# Patient Record
Sex: FEMALE | Race: ASIAN | NOT HISPANIC OR LATINO
[De-identification: names, ages, dates, MRNs, and addresses within clinical notes are randomized per-mention and may not be internally consistent; named-entity substitution may affect disease eponyms.]

---

## 2017-03-15 ENCOUNTER — APPOINTMENT (OUTPATIENT)
Dept: CARDIOLOGY | Facility: CLINIC | Age: 82
End: 2017-03-15

## 2017-03-23 ENCOUNTER — LABORATORY RESULT (OUTPATIENT)
Age: 82
End: 2017-03-23

## 2017-03-23 ENCOUNTER — NON-APPOINTMENT (OUTPATIENT)
Age: 82
End: 2017-03-23

## 2017-03-23 ENCOUNTER — APPOINTMENT (OUTPATIENT)
Dept: CARDIOLOGY | Facility: CLINIC | Age: 82
End: 2017-03-23

## 2017-03-23 VITALS
SYSTOLIC BLOOD PRESSURE: 144 MMHG | DIASTOLIC BLOOD PRESSURE: 70 MMHG | WEIGHT: 183 LBS | OXYGEN SATURATION: 97 % | TEMPERATURE: 97.5 F | RESPIRATION RATE: 16 BRPM | BODY MASS INDEX: 32.42 KG/M2 | HEART RATE: 66 BPM

## 2017-03-28 LAB
25(OH)D3 SERPL-MCNC: 45.6 NG/ML
ALBUMIN SERPL ELPH-MCNC: 4 G/DL
ALP BLD-CCNC: 66 U/L
ALT SERPL-CCNC: 33 U/L
ANION GAP SERPL CALC-SCNC: 12 MMOL/L
APPEARANCE: CLEAR
AST SERPL-CCNC: 34 U/L
BASOPHILS # BLD AUTO: 0.01 K/UL
BASOPHILS NFR BLD AUTO: 0.2 %
BILIRUB SERPL-MCNC: 0.3 MG/DL
BILIRUBIN URINE: NEGATIVE
BLOOD URINE: ABNORMAL
BUN SERPL-MCNC: 14 MG/DL
CALCIUM SERPL-MCNC: 8.9 MG/DL
CHLORIDE SERPL-SCNC: 97 MMOL/L
CHOLEST SERPL-MCNC: 174 MG/DL
CHOLEST/HDLC SERPL: 3.6 RATIO
CO2 SERPL-SCNC: 28 MMOL/L
COLOR: YELLOW
CREAT SERPL-MCNC: 0.56 MG/DL
EOSINOPHIL # BLD AUTO: 0.06 K/UL
EOSINOPHIL NFR BLD AUTO: 1.2 %
GLUCOSE QUALITATIVE U: NORMAL MG/DL
GLUCOSE SERPL-MCNC: 95 MG/DL
HBA1C MFR BLD HPLC: 6.8 %
HCT VFR BLD CALC: 36.9 %
HDLC SERPL-MCNC: 49 MG/DL
HGB BLD-MCNC: 12.1 G/DL
IMM GRANULOCYTES NFR BLD AUTO: 0.2 %
KETONES URINE: NEGATIVE
LDLC SERPL CALC-MCNC: 96 MG/DL
LEUKOCYTE ESTERASE URINE: NEGATIVE
LYMPHOCYTES # BLD AUTO: 2.06 K/UL
LYMPHOCYTES NFR BLD AUTO: 39.8 %
MAN DIFF?: NORMAL
MCHC RBC-ENTMCNC: 32.8 GM/DL
MCHC RBC-ENTMCNC: 32.8 PG
MCV RBC AUTO: 100 FL
MONOCYTES # BLD AUTO: 0.54 K/UL
MONOCYTES NFR BLD AUTO: 10.4 %
NEUTROPHILS # BLD AUTO: 2.49 K/UL
NEUTROPHILS NFR BLD AUTO: 48.2 %
NITRITE URINE: NEGATIVE
PH URINE: 7
PLATELET # BLD AUTO: 162 K/UL
POTASSIUM SERPL-SCNC: 3.7 MMOL/L
PROT SERPL-MCNC: 7.4 G/DL
PROTEIN URINE: NEGATIVE MG/DL
RBC # BLD: 3.69 M/UL
RBC # FLD: 12.6 %
SODIUM SERPL-SCNC: 137 MMOL/L
SPECIFIC GRAVITY URINE: 1.01
TRIGL SERPL-MCNC: 145 MG/DL
TSH SERPL-ACNC: 2.99 UIU/ML
UROBILINOGEN URINE: NORMAL MG/DL
VIT B12 SERPL-MCNC: 467 PG/ML
WBC # FLD AUTO: 5.17 K/UL

## 2017-05-31 ENCOUNTER — MEDICATION RENEWAL (OUTPATIENT)
Age: 82
End: 2017-05-31

## 2017-07-31 ENCOUNTER — MEDICATION RENEWAL (OUTPATIENT)
Age: 82
End: 2017-07-31

## 2017-09-12 ENCOUNTER — APPOINTMENT (OUTPATIENT)
Dept: CARDIOLOGY | Facility: CLINIC | Age: 82
End: 2017-09-12
Payer: MEDICARE

## 2017-09-12 VITALS
RESPIRATION RATE: 17 BRPM | SYSTOLIC BLOOD PRESSURE: 138 MMHG | WEIGHT: 176 LBS | BODY MASS INDEX: 31.18 KG/M2 | TEMPERATURE: 99.3 F | HEART RATE: 79 BPM | DIASTOLIC BLOOD PRESSURE: 73 MMHG | OXYGEN SATURATION: 96 %

## 2017-09-12 PROCEDURE — 99214 OFFICE O/P EST MOD 30 MIN: CPT

## 2017-09-15 LAB
25(OH)D3 SERPL-MCNC: 35.5 NG/ML
ALBUMIN SERPL ELPH-MCNC: 4.1 G/DL
ALP BLD-CCNC: 70 U/L
ALT SERPL-CCNC: 41 U/L
ANION GAP SERPL CALC-SCNC: 14 MMOL/L
AST SERPL-CCNC: 43 U/L
BASOPHILS # BLD AUTO: 0.01 K/UL
BASOPHILS NFR BLD AUTO: 0.2 %
BILIRUB SERPL-MCNC: 0.4 MG/DL
BUN SERPL-MCNC: 17 MG/DL
CALCIUM SERPL-MCNC: 9.8 MG/DL
CHLORIDE SERPL-SCNC: 99 MMOL/L
CHOLEST SERPL-MCNC: 194 MG/DL
CHOLEST/HDLC SERPL: 3.5 RATIO
CO2 SERPL-SCNC: 27 MMOL/L
CREAT SERPL-MCNC: 0.59 MG/DL
EOSINOPHIL # BLD AUTO: 0.06 K/UL
EOSINOPHIL NFR BLD AUTO: 1.2 %
GLUCOSE SERPL-MCNC: 184 MG/DL
HBA1C MFR BLD HPLC: 6.1 %
HCT VFR BLD CALC: 37.5 %
HDLC SERPL-MCNC: 56 MG/DL
HGB BLD-MCNC: 12.4 G/DL
IMM GRANULOCYTES NFR BLD AUTO: 0.2 %
LDLC SERPL CALC-MCNC: 111 MG/DL
LYMPHOCYTES # BLD AUTO: 1.78 K/UL
LYMPHOCYTES NFR BLD AUTO: 36.5 %
MAN DIFF?: NORMAL
MCHC RBC-ENTMCNC: 32.5 PG
MCHC RBC-ENTMCNC: 33.1 GM/DL
MCV RBC AUTO: 98.2 FL
MONOCYTES # BLD AUTO: 0.36 K/UL
MONOCYTES NFR BLD AUTO: 7.4 %
NEUTROPHILS # BLD AUTO: 2.66 K/UL
NEUTROPHILS NFR BLD AUTO: 54.5 %
PLATELET # BLD AUTO: 169 K/UL
POTASSIUM SERPL-SCNC: 3.8 MMOL/L
PROT SERPL-MCNC: 7.6 G/DL
RBC # BLD: 3.82 M/UL
RBC # FLD: 13 %
SODIUM SERPL-SCNC: 140 MMOL/L
TRIGL SERPL-MCNC: 136 MG/DL
TSH SERPL-ACNC: 3.41 UIU/ML
WBC # FLD AUTO: 4.88 K/UL

## 2017-10-09 ENCOUNTER — RESULT REVIEW (OUTPATIENT)
Age: 82
End: 2017-10-09

## 2017-11-21 ENCOUNTER — NON-APPOINTMENT (OUTPATIENT)
Age: 82
End: 2017-11-21

## 2017-11-21 ENCOUNTER — APPOINTMENT (OUTPATIENT)
Dept: CARDIOLOGY | Facility: CLINIC | Age: 82
End: 2017-11-21
Payer: MEDICARE

## 2017-11-21 VITALS
DIASTOLIC BLOOD PRESSURE: 75 MMHG | HEART RATE: 64 BPM | TEMPERATURE: 99 F | SYSTOLIC BLOOD PRESSURE: 155 MMHG | WEIGHT: 179 LBS | BODY MASS INDEX: 31.71 KG/M2 | RESPIRATION RATE: 17 BRPM | OXYGEN SATURATION: 98 %

## 2017-11-21 DIAGNOSIS — R94.31 ABNORMAL ELECTROCARDIOGRAM [ECG] [EKG]: ICD-10-CM

## 2017-11-21 PROCEDURE — 99214 OFFICE O/P EST MOD 30 MIN: CPT

## 2017-11-21 PROCEDURE — 93000 ELECTROCARDIOGRAM COMPLETE: CPT

## 2017-12-14 ENCOUNTER — RX RENEWAL (OUTPATIENT)
Age: 82
End: 2017-12-14

## 2017-12-30 ENCOUNTER — MEDICATION RENEWAL (OUTPATIENT)
Age: 82
End: 2017-12-30

## 2018-01-12 ENCOUNTER — MEDICATION RENEWAL (OUTPATIENT)
Age: 83
End: 2018-01-12

## 2018-01-23 ENCOUNTER — OTHER (OUTPATIENT)
Age: 83
End: 2018-01-23

## 2018-01-23 DIAGNOSIS — M51.9 UNSPECIFIED THORACIC, THORACOLUMBAR AND LUMBOSACRAL INTERVERTEBRAL DISC DISORDER: ICD-10-CM

## 2018-01-23 DIAGNOSIS — M25.519 PAIN IN UNSPECIFIED SHOULDER: ICD-10-CM

## 2018-02-22 ENCOUNTER — APPOINTMENT (OUTPATIENT)
Dept: CARDIOLOGY | Facility: CLINIC | Age: 83
End: 2018-02-22
Payer: MEDICARE

## 2018-02-22 VITALS
OXYGEN SATURATION: 97 % | TEMPERATURE: 97.9 F | RESPIRATION RATE: 18 BRPM | SYSTOLIC BLOOD PRESSURE: 161 MMHG | DIASTOLIC BLOOD PRESSURE: 78 MMHG | WEIGHT: 185 LBS | HEART RATE: 69 BPM | BODY MASS INDEX: 32.77 KG/M2

## 2018-02-22 PROCEDURE — 90686 IIV4 VACC NO PRSV 0.5 ML IM: CPT

## 2018-02-22 PROCEDURE — 99214 OFFICE O/P EST MOD 30 MIN: CPT

## 2018-02-22 PROCEDURE — G0008: CPT

## 2018-04-02 ENCOUNTER — RX RENEWAL (OUTPATIENT)
Age: 83
End: 2018-04-02

## 2018-06-11 ENCOUNTER — NON-APPOINTMENT (OUTPATIENT)
Age: 83
End: 2018-06-11

## 2018-06-11 ENCOUNTER — APPOINTMENT (OUTPATIENT)
Age: 83
End: 2018-06-11
Payer: MEDICARE

## 2018-06-11 ENCOUNTER — OTHER (OUTPATIENT)
Age: 83
End: 2018-06-11

## 2018-06-11 VITALS
SYSTOLIC BLOOD PRESSURE: 133 MMHG | RESPIRATION RATE: 18 BRPM | BODY MASS INDEX: 32.24 KG/M2 | OXYGEN SATURATION: 96 % | HEART RATE: 77 BPM | TEMPERATURE: 98.6 F | DIASTOLIC BLOOD PRESSURE: 76 MMHG | WEIGHT: 182 LBS

## 2018-06-11 DIAGNOSIS — M54.2 CERVICALGIA: ICD-10-CM

## 2018-06-11 PROCEDURE — 93000 ELECTROCARDIOGRAM COMPLETE: CPT

## 2018-06-11 PROCEDURE — 99214 OFFICE O/P EST MOD 30 MIN: CPT

## 2018-06-25 ENCOUNTER — MEDICATION RENEWAL (OUTPATIENT)
Age: 83
End: 2018-06-25

## 2018-06-29 PROBLEM — M54.2 NECK DISCOMFORT: Status: ACTIVE | Noted: 2018-06-29

## 2018-07-20 ENCOUNTER — MEDICATION RENEWAL (OUTPATIENT)
Age: 83
End: 2018-07-20

## 2018-07-23 ENCOUNTER — APPOINTMENT (OUTPATIENT)
Dept: CARDIOLOGY | Facility: CLINIC | Age: 83
End: 2018-07-23
Payer: MEDICARE

## 2018-07-23 VITALS
WEIGHT: 181 LBS | DIASTOLIC BLOOD PRESSURE: 74 MMHG | OXYGEN SATURATION: 95 % | BODY MASS INDEX: 32.06 KG/M2 | RESPIRATION RATE: 18 BRPM | TEMPERATURE: 98.1 F | SYSTOLIC BLOOD PRESSURE: 146 MMHG | HEART RATE: 69 BPM

## 2018-07-23 PROCEDURE — 99214 OFFICE O/P EST MOD 30 MIN: CPT

## 2018-07-23 RX ORDER — BUDESONIDE AND FORMOTEROL FUMARATE DIHYDRATE 80; 4.5 UG/1; UG/1
80-4.5 AEROSOL RESPIRATORY (INHALATION) TWICE DAILY
Qty: 1 | Refills: 0 | Status: ACTIVE | COMMUNITY
Start: 2018-07-23 | End: 1900-01-01

## 2018-07-24 ENCOUNTER — OTHER (OUTPATIENT)
Age: 83
End: 2018-07-24

## 2018-08-10 ENCOUNTER — MEDICATION RENEWAL (OUTPATIENT)
Age: 83
End: 2018-08-10

## 2018-11-04 NOTE — REASON FOR VISIT
[Follow-Up - Clinic] : a clinic follow-up of [Hyperlipidemia] : hyperlipidemia [Hypertension] : hypertension

## 2018-11-05 ENCOUNTER — APPOINTMENT (OUTPATIENT)
Dept: CARDIOLOGY | Facility: CLINIC | Age: 83
End: 2018-11-05
Payer: MEDICARE

## 2018-11-05 VITALS
OXYGEN SATURATION: 97 % | TEMPERATURE: 98.1 F | RESPIRATION RATE: 18 BRPM | DIASTOLIC BLOOD PRESSURE: 73 MMHG | SYSTOLIC BLOOD PRESSURE: 156 MMHG | HEART RATE: 74 BPM | WEIGHT: 184 LBS | BODY MASS INDEX: 32.6 KG/M2 | HEIGHT: 63 IN

## 2018-11-05 PROCEDURE — G0008: CPT

## 2018-11-05 PROCEDURE — 99214 OFFICE O/P EST MOD 30 MIN: CPT

## 2018-11-05 PROCEDURE — 90682 RIV4 VACC RECOMBINANT DNA IM: CPT

## 2018-11-06 ENCOUNTER — MEDICATION RENEWAL (OUTPATIENT)
Age: 83
End: 2018-11-06

## 2018-11-15 LAB
25(OH)D3 SERPL-MCNC: 52.5 NG/ML
ALBUMIN SERPL ELPH-MCNC: 4.3 G/DL
ALP BLD-CCNC: 60 U/L
ALT SERPL-CCNC: 67 U/L
ANION GAP SERPL CALC-SCNC: 13 MMOL/L
AST SERPL-CCNC: 89 U/L
BASOPHILS # BLD AUTO: 0.01 K/UL
BASOPHILS NFR BLD AUTO: 0.1 %
BILIRUB SERPL-MCNC: 0.4 MG/DL
BUN SERPL-MCNC: 16 MG/DL
CALCIUM SERPL-MCNC: 9.7 MG/DL
CHLORIDE SERPL-SCNC: 98 MMOL/L
CHOLEST SERPL-MCNC: 177 MG/DL
CHOLEST/HDLC SERPL: 3.3 RATIO
CO2 SERPL-SCNC: 29 MMOL/L
CREAT SERPL-MCNC: 0.56 MG/DL
EOSINOPHIL # BLD AUTO: 0.1 K/UL
EOSINOPHIL NFR BLD AUTO: 1.4 %
GLUCOSE SERPL-MCNC: 76 MG/DL
HBA1C MFR BLD HPLC: 7 %
HCT VFR BLD CALC: 37.7 %
HDLC SERPL-MCNC: 53 MG/DL
HGB BLD-MCNC: 12.7 G/DL
IMM GRANULOCYTES NFR BLD AUTO: 0.3 %
LDLC SERPL CALC-MCNC: 102 MG/DL
LYMPHOCYTES # BLD AUTO: 3.37 K/UL
LYMPHOCYTES NFR BLD AUTO: 46.3 %
MAN DIFF?: NORMAL
MCHC RBC-ENTMCNC: 32.9 PG
MCHC RBC-ENTMCNC: 33.7 GM/DL
MCV RBC AUTO: 97.7 FL
MONOCYTES # BLD AUTO: 0.64 K/UL
MONOCYTES NFR BLD AUTO: 8.8 %
NEUTROPHILS # BLD AUTO: 3.14 K/UL
NEUTROPHILS NFR BLD AUTO: 43.1 %
PLATELET # BLD AUTO: 167 K/UL
POTASSIUM SERPL-SCNC: 4.1 MMOL/L
PROT SERPL-MCNC: 7.8 G/DL
RBC # BLD: 3.86 M/UL
RBC # FLD: 12.9 %
SODIUM SERPL-SCNC: 140 MMOL/L
TRIGL SERPL-MCNC: 112 MG/DL
TSH SERPL-ACNC: 3.26 UIU/ML
WBC # FLD AUTO: 7.28 K/UL

## 2019-01-08 ENCOUNTER — MEDICATION RENEWAL (OUTPATIENT)
Age: 84
End: 2019-01-08

## 2019-01-21 ENCOUNTER — MEDICATION RENEWAL (OUTPATIENT)
Age: 84
End: 2019-01-21

## 2019-02-13 ENCOUNTER — APPOINTMENT (OUTPATIENT)
Dept: CARDIOLOGY | Facility: CLINIC | Age: 84
End: 2019-02-13
Payer: MEDICARE

## 2019-02-13 VITALS
OXYGEN SATURATION: 95 % | WEIGHT: 183 LBS | DIASTOLIC BLOOD PRESSURE: 73 MMHG | TEMPERATURE: 99.4 F | SYSTOLIC BLOOD PRESSURE: 138 MMHG | RESPIRATION RATE: 17 BRPM | HEART RATE: 86 BPM | BODY MASS INDEX: 32.42 KG/M2

## 2019-02-13 DIAGNOSIS — R05 COUGH: ICD-10-CM

## 2019-02-13 PROCEDURE — 99214 OFFICE O/P EST MOD 30 MIN: CPT

## 2019-02-13 RX ORDER — GUAIFENESIN AND CODEINE PHOSPHATE 10; 100 MG/5ML; MG/5ML
100-10 SOLUTION ORAL
Qty: 1 | Refills: 0 | Status: ACTIVE | COMMUNITY
Start: 2018-07-24 | End: 1900-01-01

## 2019-02-13 RX ORDER — AZITHROMYCIN 250 MG/1
250 TABLET, FILM COATED ORAL
Qty: 6 | Refills: 0 | Status: ACTIVE | COMMUNITY
Start: 2018-07-23 | End: 1900-01-01

## 2019-03-05 ENCOUNTER — MEDICATION RENEWAL (OUTPATIENT)
Age: 84
End: 2019-03-05

## 2019-03-25 ENCOUNTER — RX RENEWAL (OUTPATIENT)
Age: 84
End: 2019-03-25

## 2019-04-01 ENCOUNTER — MEDICATION RENEWAL (OUTPATIENT)
Age: 84
End: 2019-04-01

## 2019-04-02 ENCOUNTER — NON-APPOINTMENT (OUTPATIENT)
Age: 84
End: 2019-04-02

## 2019-04-02 ENCOUNTER — APPOINTMENT (OUTPATIENT)
Dept: CARDIOLOGY | Facility: CLINIC | Age: 84
End: 2019-04-02
Payer: MEDICARE

## 2019-04-02 VITALS
BODY MASS INDEX: 32.95 KG/M2 | DIASTOLIC BLOOD PRESSURE: 80 MMHG | TEMPERATURE: 98.1 F | WEIGHT: 186 LBS | OXYGEN SATURATION: 95 % | HEART RATE: 81 BPM | SYSTOLIC BLOOD PRESSURE: 170 MMHG | RESPIRATION RATE: 17 BRPM

## 2019-04-02 DIAGNOSIS — R73.9 HYPERGLYCEMIA, UNSPECIFIED: ICD-10-CM

## 2019-04-02 PROCEDURE — 99214 OFFICE O/P EST MOD 30 MIN: CPT

## 2019-04-02 PROCEDURE — 93000 ELECTROCARDIOGRAM COMPLETE: CPT

## 2019-04-04 LAB
ALBUMIN SERPL ELPH-MCNC: 4.3 G/DL
ALP BLD-CCNC: 70 U/L
ALT SERPL-CCNC: 51 U/L
ANION GAP SERPL CALC-SCNC: 14 MMOL/L
AST SERPL-CCNC: 61 U/L
BILIRUB SERPL-MCNC: 0.6 MG/DL
BUN SERPL-MCNC: 18 MG/DL
CALCIUM SERPL-MCNC: 10.3 MG/DL
CHLORIDE SERPL-SCNC: 98 MMOL/L
CO2 SERPL-SCNC: 30 MMOL/L
CREAT SERPL-MCNC: 0.58 MG/DL
ESTIMATED AVERAGE GLUCOSE: 169 MG/DL
GLUCOSE SERPL-MCNC: 235 MG/DL
HBA1C MFR BLD HPLC: 7.5 %
POTASSIUM SERPL-SCNC: 4.4 MMOL/L
PROT SERPL-MCNC: 7.4 G/DL
SODIUM SERPL-SCNC: 142 MMOL/L

## 2019-05-17 ENCOUNTER — APPOINTMENT (OUTPATIENT)
Dept: CARDIOLOGY | Facility: CLINIC | Age: 84
End: 2019-05-17
Payer: MEDICARE

## 2019-05-17 VITALS
DIASTOLIC BLOOD PRESSURE: 72 MMHG | HEART RATE: 82 BPM | RESPIRATION RATE: 17 BRPM | TEMPERATURE: 97.8 F | WEIGHT: 180 LBS | OXYGEN SATURATION: 96 % | SYSTOLIC BLOOD PRESSURE: 156 MMHG | BODY MASS INDEX: 31.89 KG/M2

## 2019-05-17 DIAGNOSIS — M25.569 PAIN IN UNSPECIFIED KNEE: ICD-10-CM

## 2019-05-17 PROCEDURE — 99214 OFFICE O/P EST MOD 30 MIN: CPT

## 2019-05-17 RX ORDER — DICLOFENAC SODIUM 10 MG/G
1 GEL TOPICAL
Qty: 1 | Refills: 1 | Status: ACTIVE | COMMUNITY
Start: 2019-05-17 | End: 1900-01-01

## 2019-05-17 NOTE — HISTORY OF PRESENT ILLNESS
[FreeTextEntry1] : Patient twisted her R knee last Saturday and may need an MRI. Patient reports that she has been skipping dinner and she felt better and stopped taking DM medications. Patient would need a blood test.  BP is 156/72. Patient reports that she is more tired last few weeks from babysitting her grandkid.

## 2019-05-17 NOTE — PHYSICAL EXAM
[General Appearance - Well Developed] : well developed [Normal Appearance] : normal appearance [Well Groomed] : well groomed [General Appearance - Well Nourished] : well nourished [General Appearance - In No Acute Distress] : no acute distress [No Deformities] : no deformities [Normal Conjunctiva] : the conjunctiva exhibited no abnormalities [Normal Oral Mucosa] : normal oral mucosa [Eyelids - No Xanthelasma] : the eyelids demonstrated no xanthelasmas [No Oral Pallor] : no oral pallor [No Oral Cyanosis] : no oral cyanosis [Normal Jugular Venous V Waves Present] : normal jugular venous V waves present [Normal Jugular Venous A Waves Present] : normal jugular venous A waves present [No Jugular Venous Martinez A Waves] : no jugular venous martinez A waves [Respiration, Rhythm And Depth] : normal respiratory rhythm and effort [Exaggerated Use Of Accessory Muscles For Inspiration] : no accessory muscle use [Auscultation Breath Sounds / Voice Sounds] : lungs were clear to auscultation bilaterally [Heart Rate And Rhythm] : heart rate and rhythm were normal [Heart Sounds] : normal S1 and S2 [Abdomen Soft] : soft [Murmurs] : no murmurs present [Abdomen Tenderness] : non-tender [Abdomen Mass (___ Cm)] : no abdominal mass palpated [Abnormal Walk] : normal gait [Nail Clubbing] : no clubbing of the fingernails [Gait - Sufficient For Exercise Testing] : the gait was sufficient for exercise testing [] : no ischemic changes [Petechial Hemorrhages (___cm)] : no petechial hemorrhages [Cyanosis, Localized] : no localized cyanosis [Oriented To Time, Place, And Person] : oriented to person, place, and time [Mood] : the mood was normal [Affect] : the affect was normal [No Anxiety] : not feeling anxious

## 2019-05-31 ENCOUNTER — MEDICATION RENEWAL (OUTPATIENT)
Age: 84
End: 2019-05-31

## 2019-06-24 ENCOUNTER — FORM ENCOUNTER (OUTPATIENT)
Age: 84
End: 2019-06-24

## 2019-06-25 ENCOUNTER — OUTPATIENT (OUTPATIENT)
Dept: OUTPATIENT SERVICES | Facility: HOSPITAL | Age: 84
LOS: 1 days | End: 2019-06-25
Payer: MEDICARE

## 2019-06-25 ENCOUNTER — APPOINTMENT (OUTPATIENT)
Dept: ORTHOPEDIC SURGERY | Facility: CLINIC | Age: 84
End: 2019-06-25
Payer: MEDICARE

## 2019-06-25 PROCEDURE — 73562 X-RAY EXAM OF KNEE 3: CPT

## 2019-06-25 PROCEDURE — 73562 X-RAY EXAM OF KNEE 3: CPT | Mod: 26,50

## 2019-06-25 PROCEDURE — 20610 DRAIN/INJ JOINT/BURSA W/O US: CPT | Mod: RT

## 2019-06-25 PROCEDURE — 99203 OFFICE O/P NEW LOW 30 MIN: CPT | Mod: 25

## 2019-06-25 NOTE — PROCEDURE
[Injection] : Injection [Right] : of the right [Knee Joint] : knee joint [Joint Pain] : Joint pain [Patient] : patient [Risk] : risk [Benefits] : benefits [Alternatives] : alternatives [Bleeding] : bleeding [Infection] : infection [Allergic Reaction] : allergic reaction [Verbal Consent Obtained] : verbal consent was obtained prior to the procedure [Alcohol] : Alcohol [Betadine] : Betadine [20] : a 20-gauge [1% Lidocaine___(mL)] : [unfilled] mL of 1% Lidocaine [Methylpred. 40mg/mL___(mL)] : [unfilled] mL of 40mg/mL methylprednisolone [Bandage Applied] : a bandage [Tolerated Well] : The patient tolerated the procedure well [None] : none

## 2019-06-28 NOTE — ASSESSMENT
[FreeTextEntry1] : plan:\par -injection performed today\par -f/u in 6 weeks\par -if pain persists will do gel....

## 2019-06-28 NOTE — REASON FOR VISIT
[Initial Visit] : an initial visit for [Knee Pain] : knee pain [Family Member] : family member [FreeTextEntry2] : right knee

## 2019-06-28 NOTE — PHYSICAL EXAM
[de-identified] : right knee:\par ROM 0-100\par stable v.v\par stable AP\par normal alignment\par crepitus on ROM\par medial joint line tenderness [de-identified] : bilateral knee severe PF OA. medial compartment narrowing.\par \par MRI reviewed. report on chart

## 2019-06-28 NOTE — HISTORY OF PRESENT ILLNESS
[de-identified] : 83 female with right knee pain following long car ride with leg held in bent position. pain is parapatellar in nature and favors medially. there was no other injury. she has had long standing pain in the knee and it was only recently exacerbated. she would like to pursue all non operative measures

## 2019-07-31 ENCOUNTER — APPOINTMENT (OUTPATIENT)
Dept: ORTHOPEDIC SURGERY | Facility: CLINIC | Age: 84
End: 2019-07-31
Payer: MEDICARE

## 2019-07-31 PROCEDURE — 99213 OFFICE O/P EST LOW 20 MIN: CPT

## 2019-08-02 NOTE — HISTORY OF PRESENT ILLNESS
[de-identified] : This is a 84-year-old female with long history of right knee pain. Patient is here more month ago when we injected her right knee with cortisone. She states that he's had intermittent pain secondary to increase his activities and then following that with decrease in activities. She is found that if she walks at a slow pace she has decreased pain. She states the pain is primarily in the parapatellar area and when she exercises too much.

## 2019-08-02 NOTE — ASSESSMENT
[FreeTextEntry1] : Assessment: This is an 84-year-old female with right knee osteoarthritis primarily patellofemoral region\par Plan: We are going to have her continue with her decrease activities. She is also going to followup in 2 months with one time we'll injection he again with cortisone. We should also plan on discussing a gel injections with her the possible.\par \par All medical record entries made by the PA/Scribe/Fellow are at my, Dr. Kieran Kapoor's direction and personally dictated by me on 07/31/2019]. I have reviewed the chart and agree that the record accurately reflects my personal performance of the history, physical exam, assessment, and plan. I have also personally directed reviewed, and agreed with the chart.\par

## 2019-08-02 NOTE — PHYSICAL EXAM
[de-identified] : Physical examination demonstrates her right knee has 0-95° of range of motion. There is no significant effusion present. His pain in the medial joint line as well as the parapatellar area. She is stable to varus valgus stress. She is neurovascular intact distally

## 2019-09-18 ENCOUNTER — APPOINTMENT (OUTPATIENT)
Dept: ORTHOPEDIC SURGERY | Facility: CLINIC | Age: 84
End: 2019-09-18
Payer: MEDICARE

## 2019-09-18 DIAGNOSIS — M17.10 UNILATERAL PRIMARY OSTEOARTHRITIS, UNSPECIFIED KNEE: ICD-10-CM

## 2019-09-18 PROCEDURE — 99213 OFFICE O/P EST LOW 20 MIN: CPT

## 2019-09-24 ENCOUNTER — APPOINTMENT (OUTPATIENT)
Dept: CARDIOLOGY | Facility: CLINIC | Age: 84
End: 2019-09-24
Payer: MEDICARE

## 2019-09-24 VITALS
DIASTOLIC BLOOD PRESSURE: 73 MMHG | SYSTOLIC BLOOD PRESSURE: 151 MMHG | HEART RATE: 88 BPM | BODY MASS INDEX: 32.24 KG/M2 | RESPIRATION RATE: 17 BRPM | TEMPERATURE: 98 F | OXYGEN SATURATION: 95 % | WEIGHT: 182 LBS

## 2019-09-24 PROCEDURE — 99214 OFFICE O/P EST MOD 30 MIN: CPT

## 2019-09-24 PROCEDURE — 90688 IIV4 VACCINE SPLT 0.5 ML IM: CPT

## 2019-09-24 PROCEDURE — G0008: CPT

## 2019-09-24 NOTE — HISTORY OF PRESENT ILLNESS
[FreeTextEntry1] : Patient's BP is 140 on second measure. She is only taking 1/2 of Losartan 50 mg. Patient has stopped taking Metformin 500 mg because she stopped eating rice at night. She is getting experimental knee treatment and doing well on knee exercises. She had a recent derm biopsy for a basal cell ca on the nose.

## 2019-09-24 NOTE — PHYSICAL EXAM
[General Appearance - Well Developed] : well developed [Normal Appearance] : normal appearance [Well Groomed] : well groomed [General Appearance - Well Nourished] : well nourished [No Deformities] : no deformities [General Appearance - In No Acute Distress] : no acute distress [Normal Conjunctiva] : the conjunctiva exhibited no abnormalities [Eyelids - No Xanthelasma] : the eyelids demonstrated no xanthelasmas [Normal Oral Mucosa] : normal oral mucosa [No Oral Pallor] : no oral pallor [No Oral Cyanosis] : no oral cyanosis [Normal Jugular Venous A Waves Present] : normal jugular venous A waves present [Normal Jugular Venous V Waves Present] : normal jugular venous V waves present [No Jugular Venous Martinez A Waves] : no jugular venous martinez A waves [Respiration, Rhythm And Depth] : normal respiratory rhythm and effort [Exaggerated Use Of Accessory Muscles For Inspiration] : no accessory muscle use [Auscultation Breath Sounds / Voice Sounds] : lungs were clear to auscultation bilaterally [Heart Rate And Rhythm] : heart rate and rhythm were normal [Heart Sounds] : normal S1 and S2 [Murmurs] : no murmurs present [Abdomen Soft] : soft [Abdomen Tenderness] : non-tender [Abdomen Mass (___ Cm)] : no abdominal mass palpated [Abnormal Walk] : normal gait [Gait - Sufficient For Exercise Testing] : the gait was sufficient for exercise testing [Nail Clubbing] : no clubbing of the fingernails [Cyanosis, Localized] : no localized cyanosis [Petechial Hemorrhages (___cm)] : no petechial hemorrhages [] : no ischemic changes [Oriented To Time, Place, And Person] : oriented to person, place, and time [Affect] : the affect was normal [Mood] : the mood was normal [No Anxiety] : not feeling anxious

## 2019-09-25 ENCOUNTER — APPOINTMENT (OUTPATIENT)
Dept: ORTHOPEDIC SURGERY | Facility: CLINIC | Age: 84
End: 2019-09-25
Payer: MEDICARE

## 2019-09-25 LAB
25(OH)D3 SERPL-MCNC: 40.5 NG/ML
ALBUMIN SERPL ELPH-MCNC: 4.4 G/DL
ALP BLD-CCNC: 58 U/L
ALT SERPL-CCNC: 31 U/L
ANION GAP SERPL CALC-SCNC: 15 MMOL/L
AST SERPL-CCNC: 35 U/L
BASOPHILS # BLD AUTO: 0.01 K/UL
BASOPHILS NFR BLD AUTO: 0.2 %
BILIRUB SERPL-MCNC: 0.4 MG/DL
BUN SERPL-MCNC: 15 MG/DL
CALCIUM SERPL-MCNC: 9.3 MG/DL
CHLORIDE SERPL-SCNC: 100 MMOL/L
CHOLEST SERPL-MCNC: 175 MG/DL
CHOLEST/HDLC SERPL: 3.8 RATIO
CO2 SERPL-SCNC: 27 MMOL/L
CREAT SERPL-MCNC: 0.58 MG/DL
EOSINOPHIL # BLD AUTO: 0.03 K/UL
EOSINOPHIL NFR BLD AUTO: 0.6 %
ESTIMATED AVERAGE GLUCOSE: 148 MG/DL
GLUCOSE SERPL-MCNC: 269 MG/DL
HBA1C MFR BLD HPLC: 6.8 %
HBV SURFACE AB SER QL: NONREACTIVE
HBV SURFACE AG SER QL: NONREACTIVE
HCT VFR BLD CALC: 40.1 %
HCV AB SER QL: NONREACTIVE
HCV S/CO RATIO: 0.19 S/CO
HDLC SERPL-MCNC: 46 MG/DL
HGB BLD-MCNC: 13 G/DL
IMM GRANULOCYTES NFR BLD AUTO: 0.2 %
LDLC SERPL CALC-MCNC: 102 MG/DL
LYMPHOCYTES # BLD AUTO: 1.82 K/UL
LYMPHOCYTES NFR BLD AUTO: 36.3 %
MAN DIFF?: NORMAL
MCHC RBC-ENTMCNC: 32.4 GM/DL
MCHC RBC-ENTMCNC: 33.3 PG
MCV RBC AUTO: 102.8 FL
MONOCYTES # BLD AUTO: 0.28 K/UL
MONOCYTES NFR BLD AUTO: 5.6 %
NEUTROPHILS # BLD AUTO: 2.86 K/UL
NEUTROPHILS NFR BLD AUTO: 57.1 %
PLATELET # BLD AUTO: 147 K/UL
POTASSIUM SERPL-SCNC: 4.1 MMOL/L
PROT SERPL-MCNC: 7 G/DL
RBC # BLD: 3.9 M/UL
RBC # FLD: 12.2 %
SODIUM SERPL-SCNC: 142 MMOL/L
TRIGL SERPL-MCNC: 133 MG/DL
TSH SERPL-ACNC: 2.1 UIU/ML
WBC # FLD AUTO: 5.01 K/UL

## 2019-09-25 PROCEDURE — ZZZZZ: CPT

## 2019-09-25 NOTE — PHYSICAL EXAM
[de-identified] : Physical examination demonstrates her right knee has 0-95° of range of motion. There is no significant effusion present. His pain in the medial joint line as well as the parapatellar area. She is stable to varus valgus stress. She is neurovascular intact distally

## 2019-09-25 NOTE — HISTORY OF PRESENT ILLNESS
[de-identified] : Following up for her right knee, we gave her an injection about 3 months ago, it still working pretty well and she has mild pain at rest and moderate only with vigorous activity. No other complaints at this time.

## 2019-09-25 NOTE — ASSESSMENT
[FreeTextEntry1] : Assessment/plan\par Right knee arthritis\par \par Her going to get her enrolled in the knee brace study, her pain is not severe enough to warrant a knee injection today, but she doesn't has knee joint space narrowing on x-rays and she has a supportive family who will help her get back and forth to these appointments to accommodate for study.\par \par All medical record entries made by the PA/Scribe/Fellow are at my, Dr. Kieran Kapoor's direction and personally dictated by me on 09/18/2019]. I have reviewed the chart and agree that the record accurately reflects my personal performance of the history, physical exam, assessment, and plan. I have also personally directed reviewed, and agreed with the chart.\par

## 2019-10-25 ENCOUNTER — MEDICATION RENEWAL (OUTPATIENT)
Age: 84
End: 2019-10-25

## 2019-10-25 RX ORDER — CALCIUM CARBONATE/VITAMIN D3 600 MG-10
600-400 TABLET ORAL
Qty: 180 | Refills: 3 | Status: DISCONTINUED | COMMUNITY
Start: 2017-12-14 | End: 2019-10-25

## 2019-12-04 ENCOUNTER — APPOINTMENT (OUTPATIENT)
Dept: ORTHOPEDIC SURGERY | Facility: CLINIC | Age: 84
End: 2019-12-04

## 2019-12-11 ENCOUNTER — APPOINTMENT (OUTPATIENT)
Dept: ORTHOPEDIC SURGERY | Facility: CLINIC | Age: 84
End: 2019-12-11

## 2019-12-30 ENCOUNTER — MEDICATION RENEWAL (OUTPATIENT)
Age: 84
End: 2019-12-30

## 2020-01-15 DIAGNOSIS — E78.1 PURE HYPERGLYCERIDEMIA: ICD-10-CM

## 2020-08-24 ENCOUNTER — NON-APPOINTMENT (OUTPATIENT)
Age: 85
End: 2020-08-24

## 2020-08-24 ENCOUNTER — APPOINTMENT (OUTPATIENT)
Dept: CARDIOLOGY | Facility: CLINIC | Age: 85
End: 2020-08-24
Payer: MEDICARE

## 2020-08-24 VITALS
HEIGHT: 61 IN | DIASTOLIC BLOOD PRESSURE: 70 MMHG | WEIGHT: 184 LBS | BODY MASS INDEX: 34.74 KG/M2 | HEART RATE: 75 BPM | RESPIRATION RATE: 17 BRPM | TEMPERATURE: 98 F | OXYGEN SATURATION: 96 % | SYSTOLIC BLOOD PRESSURE: 153 MMHG

## 2020-08-24 PROCEDURE — G0008: CPT

## 2020-08-24 PROCEDURE — 93000 ELECTROCARDIOGRAM COMPLETE: CPT

## 2020-08-24 PROCEDURE — 90688 IIV4 VACCINE SPLT 0.5 ML IM: CPT

## 2020-08-24 PROCEDURE — 99214 OFFICE O/P EST MOD 30 MIN: CPT

## 2020-08-24 NOTE — PHYSICAL EXAM
[General Appearance - Well Developed] : well developed [Normal Appearance] : normal appearance [Well Groomed] : well groomed [General Appearance - Well Nourished] : well nourished [No Deformities] : no deformities [General Appearance - In No Acute Distress] : no acute distress [Normal Conjunctiva] : the conjunctiva exhibited no abnormalities [Eyelids - No Xanthelasma] : the eyelids demonstrated no xanthelasmas [Normal Oral Mucosa] : normal oral mucosa [No Oral Pallor] : no oral pallor [No Oral Cyanosis] : no oral cyanosis [Normal Jugular Venous A Waves Present] : normal jugular venous A waves present [Normal Jugular Venous V Waves Present] : normal jugular venous V waves present [No Jugular Venous Martinez A Waves] : no jugular venous martinez A waves [Respiration, Rhythm And Depth] : normal respiratory rhythm and effort [Exaggerated Use Of Accessory Muscles For Inspiration] : no accessory muscle use [Auscultation Breath Sounds / Voice Sounds] : lungs were clear to auscultation bilaterally [Heart Rate And Rhythm] : heart rate and rhythm were normal [Heart Sounds] : normal S1 and S2 [Murmurs] : no murmurs present [Abdomen Soft] : soft [Abdomen Tenderness] : non-tender [Abdomen Mass (___ Cm)] : no abdominal mass palpated [Abnormal Walk] : normal gait [Gait - Sufficient For Exercise Testing] : the gait was sufficient for exercise testing [Nail Clubbing] : no clubbing of the fingernails [Cyanosis, Localized] : no localized cyanosis [Petechial Hemorrhages (___cm)] : no petechial hemorrhages [] : no ischemic changes [Oriented To Time, Place, And Person] : oriented to person, place, and time [Affect] : the affect was normal [Mood] : the mood was normal [No Anxiety] : not feeling anxious [Arterial Pulses Normal] : the arterial pulses were normal [Scattered Wheezes] : scattered wheezing [FreeTextEntry1] : 2/6 MAZIN upper sternal borders; 1+ edema bilaterally

## 2020-08-24 NOTE — PHYSICAL EXAM
[General Appearance - Well Developed] : well developed [Normal Appearance] : normal appearance [Well Groomed] : well groomed [General Appearance - Well Nourished] : well nourished [No Deformities] : no deformities [General Appearance - In No Acute Distress] : no acute distress [Normal Conjunctiva] : the conjunctiva exhibited no abnormalities [Eyelids - No Xanthelasma] : the eyelids demonstrated no xanthelasmas [No Oral Pallor] : no oral pallor [No Oral Cyanosis] : no oral cyanosis [Normal Jugular Venous A Waves Present] : normal jugular venous A waves present [Normal Jugular Venous V Waves Present] : normal jugular venous V waves present [No Jugular Venous Martinez A Waves] : no jugular venous martinez A waves [Respiration, Rhythm And Depth] : normal respiratory rhythm and effort [Exaggerated Use Of Accessory Muscles For Inspiration] : no accessory muscle use [Scattered Wheezes] : scattered wheezing [Heart Rate And Rhythm] : heart rate and rhythm were normal [Heart Sounds] : normal S1 and S2 [Arterial Pulses Normal] : the arterial pulses were normal [Abdomen Soft] : soft [Abdomen Tenderness] : non-tender [Abdomen Mass (___ Cm)] : no abdominal mass palpated [Nail Clubbing] : no clubbing of the fingernails [Cyanosis, Localized] : no localized cyanosis [Petechial Hemorrhages (___cm)] : no petechial hemorrhages [] : no ischemic changes [Oriented To Time, Place, And Person] : oriented to person, place, and time [Affect] : the affect was normal [Mood] : the mood was normal [No Anxiety] : not feeling anxious [FreeTextEntry1] : limited mobility.

## 2020-08-24 NOTE — HISTORY OF PRESENT ILLNESS
[FreeTextEntry1] : 83-year-old female with HTN, HLD, and HGL presents for followup.  Patient was last seen on 11/5/18.  She is on  Losartan 25 mg, Metoprolol ER 25 mg, and HCTZ 25 mg for HTN.  She is on Simvastatin 5 mg for HLD.  \par \par Patient had bad cold for a month with a bad cough and sinusitis, with blood tinged nasal discharge. Patient denies fever.  She reports having CP on the right lasting one minute since last week.  Patient will stop aspirin for a while until her running nose is better.  She will take her blood test for HgA1C next time. Patient requested for cough syrup.

## 2020-08-24 NOTE — HISTORY OF PRESENT ILLNESS
[FreeTextEntry1] : 83-year-old female with HTN, HLD, and HGL presents for followup.  Patient was last seen on 7/23/18.  She is on  Losartan 25 mg daily, Metoprolol ER 25 mg, and HCTZ 25 mg for HTN.  She is on Simvastatin 5 mg for HLD.  Patient has been stable.  Patient denies CP, SOB, or palpitations.  Her BP is elevated.  Patient reports that she is watching her salt intake.  She felt that she gained weight due to lack of exercise.  She will get Flu shot today.  Patient had shingles vaccine and pneumonia vaccine previously.  Xray showed T12 with fracture and L3, L4, L5 – showed some narrowing.  Patient had physical therapy for  her numbness in her legs.  Patient inquired about her , who was recommended for a pacemaker due low heart rate in the 40s.  Patient will check labs today.

## 2020-08-24 NOTE — DISCUSSION/SUMMARY
[FreeTextEntry1] : 83-year-old female with HTN, HLD, and HGL presents for followup.  Patient was last seen on 11/5/18.  She is on  Losartan 25 mg, Metoprolol ER 25 mg, and HCTZ 25 mg for HTN.  She is on Simvastatin 5 mg for HLD.  \par \par Patient had bad cold for a month with a bad cough and sinusitis, with blood tinged nasal discharge. Patient denies fever.  She reports having CP on the right lasting one minute since last week.  Patient will stop aspirin for a while until her running nose is better.  She will take her blood test for HgA1C next time. Patient requested for cough syrup. \par \par (1) HTN - Her BP was acceptable  today.  She may continue Losartan 25 mg daily, Metoprolol ER 25 mg and HCTZ 25 mg for now. \par \par (2) HLD -   I advised her to continue Simvastatin.\par \par (3) Hyperglycemia - She will check bloods at the next visit. I advised her to continue to limit her carbohydrate intake and exercise.\par \par (4) HCM - Patient declined mammogram.\par Dexa 2017 osteopenia\par Mammo 2015\par EGD 2013\par Colon 2013 +polyps\par \par (5) Followup - 3 months.\par

## 2020-08-24 NOTE — DISCUSSION/SUMMARY
[FreeTextEntry1] : 83-year-old female with HTN, HLD, and HGL presents for followup.  Patient was last seen on 2/13/19.  She is on  Losartan 25 mg, Metoprolol ER 25 mg, and HCTZ 25 mg for HTN.  She is on Simvastatin 5 mg for HLD.  \par \par Patient 's BP is 170 in office. She reports that it is usually no higher than 145 at home. She forgot to get her mammo or bone density test. \par \par (1) HTN - Her BP was elevated in office but normal at home.  She may continue Losartan 25 mg daily, Metoprolol ER 25 mg and HCTZ 25 mg for now. \par \par (2) HLD -   I advised her to continue Simvastatin.\par \par (3) Hyperglycemia - Her A1C was 7.5.  I advised patient to start Metformin.  \par \par (4) HCM - Patient declined mammogram.\par Dexa 2017 osteopenia\par Mammo 2015\par EGD 2013\par Colon 2013 +polyps\par \par (5) Followup - 3 months.\par

## 2020-08-24 NOTE — PHYSICAL EXAM
[General Appearance - Well Developed] : well developed [Normal Appearance] : normal appearance [Well Groomed] : well groomed [General Appearance - Well Nourished] : well nourished [No Deformities] : no deformities [General Appearance - In No Acute Distress] : no acute distress [Normal Conjunctiva] : the conjunctiva exhibited no abnormalities [Eyelids - No Xanthelasma] : the eyelids demonstrated no xanthelasmas [Normal Oral Mucosa] : normal oral mucosa [No Oral Pallor] : no oral pallor [No Oral Cyanosis] : no oral cyanosis [Normal Jugular Venous A Waves Present] : normal jugular venous A waves present [Normal Jugular Venous V Waves Present] : normal jugular venous V waves present [No Jugular Venous Martinez A Waves] : no jugular venous martinez A waves [Respiration, Rhythm And Depth] : normal respiratory rhythm and effort [Exaggerated Use Of Accessory Muscles For Inspiration] : no accessory muscle use [Auscultation Breath Sounds / Voice Sounds] : lungs were clear to auscultation bilaterally [Heart Rate And Rhythm] : heart rate and rhythm were normal [Heart Sounds] : normal S1 and S2 [Murmurs] : no murmurs present [Abdomen Soft] : soft [Abdomen Tenderness] : non-tender [Abdomen Mass (___ Cm)] : no abdominal mass palpated [Abnormal Walk] : normal gait [Gait - Sufficient For Exercise Testing] : the gait was sufficient for exercise testing [Nail Clubbing] : no clubbing of the fingernails [Cyanosis, Localized] : no localized cyanosis [Petechial Hemorrhages (___cm)] : no petechial hemorrhages [] : no ischemic changes [Oriented To Time, Place, And Person] : oriented to person, place, and time [Affect] : the affect was normal [Mood] : the mood was normal [No Anxiety] : not feeling anxious [Scattered Wheezes] : scattered wheezing [Arterial Pulses Normal] : the arterial pulses were normal [FreeTextEntry1] : limited mobility.

## 2020-08-24 NOTE — DISCUSSION/SUMMARY
[FreeTextEntry1] : 83-year-old female with HTN, HLD, and HGL presents for followup.  Patient was last seen on 7/23/18.  She is on  Losartan 25 mg daily, Metoprolol ER 25 mg, and HCTZ 25 mg for HTN.  She is on Simvastatin 5 mg for HLD.  Patient has been stable.  Patient denies CP, SOB, or palpitations.  Her BP is elevated.  Patient reports that she is watching her salt intake.  She felt that she gained weight due to lack of exercise.  She will get Flu shot today.  Patient had shingles vaccine and pneumonia vaccine previously.  Xray showed T12 with fracture and L3, L4, L5 – showed some narrowing.  Patient had physical therapy for  her numbness in her legs.  Patient inquired about her , who was recommended for a pacemaker due low heart rate in the 40s.  Patient will check labs today. \par \par (1) HTN - Her BP was elevated today.   She declined does adjustments.  She may continue Losartan 25 mg daily, Metoprolol ER 25 mg and HCTZ 25 mg for now. \par \par (2) HLD -   I advised her to continue Simvastatin.\par \par (3) Hyperglycemia - I advised her to continue to limit her carbohydrate intake and exercise.\par \par (4) HCM - Patient declined mammogram.\par Dexa 2017 osteopenia\par Mammo 2015\par EGD 2013\par Colon 2013 +polyps\par \par (5) Followup - 3 months.\par

## 2020-08-24 NOTE — HISTORY OF PRESENT ILLNESS
[FreeTextEntry1] : 83-year-old female with HTN, HLD, and HGL presents for followup.  Patient was last seen on 2/13/19.  She is on  Losartan 25 mg, Metoprolol ER 25 mg, and HCTZ 25 mg for HTN.  She is on Simvastatin 5 mg for HLD.  \par \par Patient 's BP is 170 in office. She reports that it is usually no higher than 145 at home. She forgot to get her mammo or bone density test.

## 2020-08-25 LAB
25(OH)D3 SERPL-MCNC: 49 NG/ML
ALBUMIN SERPL ELPH-MCNC: 4.3 G/DL
ALP BLD-CCNC: 50 U/L
ALT SERPL-CCNC: 24 U/L
ANION GAP SERPL CALC-SCNC: 13 MMOL/L
AST SERPL-CCNC: 30 U/L
BASOPHILS # BLD AUTO: 0.01 K/UL
BASOPHILS NFR BLD AUTO: 0.2 %
BILIRUB SERPL-MCNC: 0.4 MG/DL
BUN SERPL-MCNC: 19 MG/DL
CALCIUM SERPL-MCNC: 9 MG/DL
CHLORIDE SERPL-SCNC: 102 MMOL/L
CHOLEST SERPL-MCNC: 159 MG/DL
CHOLEST/HDLC SERPL: 3.8 RATIO
CO2 SERPL-SCNC: 25 MMOL/L
CREAT SERPL-MCNC: 0.58 MG/DL
EOSINOPHIL # BLD AUTO: 0.05 K/UL
EOSINOPHIL NFR BLD AUTO: 0.9 %
ESTIMATED AVERAGE GLUCOSE: 154 MG/DL
GLUCOSE SERPL-MCNC: 262 MG/DL
HBA1C MFR BLD HPLC: 7 %
HCT VFR BLD CALC: 39.9 %
HDLC SERPL-MCNC: 42 MG/DL
HGB BLD-MCNC: 12.8 G/DL
IMM GRANULOCYTES NFR BLD AUTO: 0.2 %
LDLC SERPL CALC-MCNC: 95 MG/DL
LYMPHOCYTES # BLD AUTO: 1.82 K/UL
LYMPHOCYTES NFR BLD AUTO: 33.1 %
MAN DIFF?: NORMAL
MCHC RBC-ENTMCNC: 32.1 GM/DL
MCHC RBC-ENTMCNC: 32.6 PG
MCV RBC AUTO: 101.5 FL
MONOCYTES # BLD AUTO: 0.33 K/UL
MONOCYTES NFR BLD AUTO: 6 %
NEUTROPHILS # BLD AUTO: 3.28 K/UL
NEUTROPHILS NFR BLD AUTO: 59.6 %
PLATELET # BLD AUTO: 159 K/UL
POTASSIUM SERPL-SCNC: 4.5 MMOL/L
PROT SERPL-MCNC: 6.9 G/DL
RBC # BLD: 3.93 M/UL
RBC # FLD: 12.5 %
SODIUM SERPL-SCNC: 140 MMOL/L
TRIGL SERPL-MCNC: 112 MG/DL
TSH SERPL-ACNC: 3.41 UIU/ML
WBC # FLD AUTO: 5.5 K/UL

## 2020-08-27 NOTE — PHYSICAL EXAM
[Normal Appearance] : normal appearance [General Appearance - Well Developed] : well developed [General Appearance - Well Nourished] : well nourished [No Deformities] : no deformities [Well Groomed] : well groomed [General Appearance - In No Acute Distress] : no acute distress [Normal Conjunctiva] : the conjunctiva exhibited no abnormalities [Eyelids - No Xanthelasma] : the eyelids demonstrated no xanthelasmas [No Oral Pallor] : no oral pallor [Normal Oral Mucosa] : normal oral mucosa [No Oral Cyanosis] : no oral cyanosis [Normal Jugular Venous A Waves Present] : normal jugular venous A waves present [Normal Jugular Venous V Waves Present] : normal jugular venous V waves present [No Jugular Venous Martinez A Waves] : no jugular venous martinez A waves [Auscultation Breath Sounds / Voice Sounds] : lungs were clear to auscultation bilaterally [Respiration, Rhythm And Depth] : normal respiratory rhythm and effort [Exaggerated Use Of Accessory Muscles For Inspiration] : no accessory muscle use [Heart Rate And Rhythm] : heart rate and rhythm were normal [Scattered Wheezes] : scattered wheezing [Arterial Pulses Normal] : the arterial pulses were normal [Heart Sounds] : normal S1 and S2 [Murmurs] : no murmurs present [Abdomen Soft] : soft [Abdomen Tenderness] : non-tender [Abnormal Walk] : normal gait [Abdomen Mass (___ Cm)] : no abdominal mass palpated [Gait - Sufficient For Exercise Testing] : the gait was sufficient for exercise testing [FreeTextEntry1] : limited mobility.  [Nail Clubbing] : no clubbing of the fingernails [Petechial Hemorrhages (___cm)] : no petechial hemorrhages [Cyanosis, Localized] : no localized cyanosis [] : no ischemic changes [Oriented To Time, Place, And Person] : oriented to person, place, and time [Affect] : the affect was normal [Mood] : the mood was normal [No Anxiety] : not feeling anxious

## 2020-08-27 NOTE — REASON FOR VISIT
[FreeTextEntry1] : Patient is doing well. She declined mammogram and bone density test. She is on diuretics, Lipitor 5 mg, and Losartan. Patient reports  at home. I advised patient to increase dose of Metoprolol to 50 mg. I advised patient to get flu shot. Fu in 6 months.

## 2021-01-13 ENCOUNTER — APPOINTMENT (OUTPATIENT)
Dept: CARDIOLOGY | Facility: CLINIC | Age: 86
End: 2021-01-13
Payer: MEDICARE

## 2021-01-13 VITALS
RESPIRATION RATE: 17 BRPM | HEART RATE: 76 BPM | OXYGEN SATURATION: 96 % | TEMPERATURE: 96.5 F | SYSTOLIC BLOOD PRESSURE: 130 MMHG | WEIGHT: 184 LBS | BODY MASS INDEX: 34.77 KG/M2 | DIASTOLIC BLOOD PRESSURE: 73 MMHG

## 2021-01-13 PROCEDURE — 99214 OFFICE O/P EST MOD 30 MIN: CPT

## 2021-01-14 LAB
ALBUMIN SERPL ELPH-MCNC: 4.4 G/DL
ALP BLD-CCNC: 57 U/L
ALT SERPL-CCNC: 18 U/L
ANION GAP SERPL CALC-SCNC: 12 MMOL/L
AST SERPL-CCNC: 19 U/L
BILIRUB SERPL-MCNC: 0.5 MG/DL
BUN SERPL-MCNC: 20 MG/DL
CALCIUM SERPL-MCNC: 9.3 MG/DL
CHLORIDE SERPL-SCNC: 101 MMOL/L
CHOLEST SERPL-MCNC: 159 MG/DL
CO2 SERPL-SCNC: 28 MMOL/L
CREAT SERPL-MCNC: 0.66 MG/DL
ESTIMATED AVERAGE GLUCOSE: 128 MG/DL
GLUCOSE SERPL-MCNC: 173 MG/DL
HBA1C MFR BLD HPLC: 6.1 %
HDLC SERPL-MCNC: 43 MG/DL
LDLC SERPL CALC-MCNC: 96 MG/DL
NONHDLC SERPL-MCNC: 116 MG/DL
POTASSIUM SERPL-SCNC: 4 MMOL/L
PROT SERPL-MCNC: 7 G/DL
SODIUM SERPL-SCNC: 141 MMOL/L
TRIGL SERPL-MCNC: 96 MG/DL

## 2021-01-14 NOTE — REASON FOR VISIT
[Follow-Up - Clinic] : a clinic follow-up of [FreeTextEntry1] : 1/31/21 - Patient has been stable.  Patient is not taking Metformin yet because she wants to try diet.  Patient denies CP, SOB, palpitations, or lightheadedness.  She is taking Metoprolol 25 mg BID.  I will check BT today.  Her BP was good.  Patient requests Metoprolol 25 mg BID instead of Metoprolol ER 50 mg.\par \par \par 8/24/20 - Patient is doing well. She declined mammogram and bone density test. She is on diuretics, Lipitor 5 mg, and Losartan. Patient reports  at home. I advised patient to increase dose of Metoprolol to 50 mg. I advised patient to get flu shot. Fu in 6 months.

## 2021-01-14 NOTE — HISTORY OF PRESENT ILLNESS
[FreeTextEntry1] : 85-year-old female with HTN, HLD, and HGL presents for followup. \par \par Patient was last seen on 8/24/20.  I advised patient to increase dose of Metoprolol to 50 mg.  She is on Losartan 25 mg,  and HCTZ 25 mg for HTN. She is on Simvastatin 5 mg for HLD. \par

## 2021-01-14 NOTE — PHYSICAL EXAM
[General Appearance - Well Developed] : well developed [Normal Appearance] : normal appearance [Well Groomed] : well groomed [General Appearance - Well Nourished] : well nourished [No Deformities] : no deformities [General Appearance - In No Acute Distress] : no acute distress [Normal Conjunctiva] : the conjunctiva exhibited no abnormalities [Eyelids - No Xanthelasma] : the eyelids demonstrated no xanthelasmas [Normal Oral Mucosa] : normal oral mucosa [No Oral Pallor] : no oral pallor [No Oral Cyanosis] : no oral cyanosis [Normal Jugular Venous A Waves Present] : normal jugular venous A waves present [Normal Jugular Venous V Waves Present] : normal jugular venous V waves present [No Jugular Venous Martinez A Waves] : no jugular venous martinez A waves [Respiration, Rhythm And Depth] : normal respiratory rhythm and effort [Exaggerated Use Of Accessory Muscles For Inspiration] : no accessory muscle use [Auscultation Breath Sounds / Voice Sounds] : lungs were clear to auscultation bilaterally [Scattered Wheezes] : scattered wheezing [Heart Rate And Rhythm] : heart rate and rhythm were normal [Heart Sounds] : normal S1 and S2 [Murmurs] : no murmurs present [Arterial Pulses Normal] : the arterial pulses were normal [Abdomen Soft] : soft [Abdomen Tenderness] : non-tender [Abdomen Mass (___ Cm)] : no abdominal mass palpated [Abnormal Walk] : normal gait [Gait - Sufficient For Exercise Testing] : the gait was sufficient for exercise testing [Nail Clubbing] : no clubbing of the fingernails [Cyanosis, Localized] : no localized cyanosis [Petechial Hemorrhages (___cm)] : no petechial hemorrhages [] : no ischemic changes [Oriented To Time, Place, And Person] : oriented to person, place, and time [Affect] : the affect was normal [Mood] : the mood was normal [No Anxiety] : not feeling anxious [FreeTextEntry1] : limited mobility.

## 2021-02-23 ENCOUNTER — RX RENEWAL (OUTPATIENT)
Age: 86
End: 2021-02-23

## 2021-04-27 ENCOUNTER — RX RENEWAL (OUTPATIENT)
Age: 86
End: 2021-04-27

## 2021-09-30 ENCOUNTER — APPOINTMENT (OUTPATIENT)
Dept: CARDIOLOGY | Facility: CLINIC | Age: 86
End: 2021-09-30
Payer: MEDICARE

## 2021-09-30 ENCOUNTER — NON-APPOINTMENT (OUTPATIENT)
Age: 86
End: 2021-09-30

## 2021-09-30 VITALS
TEMPERATURE: 97.5 F | OXYGEN SATURATION: 97 % | DIASTOLIC BLOOD PRESSURE: 75 MMHG | BODY MASS INDEX: 33.07 KG/M2 | RESPIRATION RATE: 17 BRPM | SYSTOLIC BLOOD PRESSURE: 155 MMHG | HEART RATE: 52 BPM | WEIGHT: 175 LBS

## 2021-09-30 PROCEDURE — 90662 IIV NO PRSV INCREASED AG IM: CPT

## 2021-09-30 PROCEDURE — 99214 OFFICE O/P EST MOD 30 MIN: CPT

## 2021-09-30 PROCEDURE — 93000 ELECTROCARDIOGRAM COMPLETE: CPT

## 2021-09-30 PROCEDURE — G0008: CPT

## 2021-09-30 NOTE — REASON FOR VISIT
[Symptom and Test Evaluation] : symptom and test evaluation [Follow-Up - Clinic] : a clinic follow-up of [FreeTextEntry1] : 9/30/21 - \par \par BT\par flu shot\par declined echo STR\par may need holter in the future\par \par \par 1/31/21 - Patient has been stable.  Patient is not taking Metformin yet because she wants to try diet.  Patient denies CP, SOB, palpitations, or lightheadedness.  She is taking Metoprolol 25 mg BID.  I will check BT today.  Her BP was good.  Patient requests Metoprolol 25 mg BID instead of Metoprolol ER 50 mg.\par \par 8/24/20 - Patient is doing well. She declined mammogram and bone density test. She is on diuretics, Lipitor 5 mg, and Losartan. Patient reports  at home. I advised patient to increase dose of Metoprolol to 50 mg. I advised patient to get flu shot. Fu in 6 months.

## 2021-09-30 NOTE — HISTORY OF PRESENT ILLNESS
[FreeTextEntry1] : 85-year-old female with HTN, DM, HLD presents for followup. \par \par Patient was last seen on 1/31/21 .  \par \par She is on Losartan 25 mg, Metoprolol 25 mg BID, and HCTZ 25 mg for HTN. She is on Simvastatin 5 mg for HLD.  She is on Metformin 500 mg for DM (?).\par

## 2021-10-01 LAB
25(OH)D3 SERPL-MCNC: 54.2 NG/ML
ALBUMIN SERPL ELPH-MCNC: 4.5 G/DL
ALP BLD-CCNC: 60 U/L
ALT SERPL-CCNC: 20 U/L
ANION GAP SERPL CALC-SCNC: 14 MMOL/L
APPEARANCE: CLEAR
AST SERPL-CCNC: 22 U/L
BASOPHILS # BLD AUTO: 0.02 K/UL
BASOPHILS NFR BLD AUTO: 0.3 %
BILIRUB SERPL-MCNC: 0.4 MG/DL
BILIRUBIN URINE: NEGATIVE
BLOOD URINE: NEGATIVE
BUN SERPL-MCNC: 19 MG/DL
CALCIUM SERPL-MCNC: 9.2 MG/DL
CHLORIDE SERPL-SCNC: 101 MMOL/L
CHOLEST SERPL-MCNC: 179 MG/DL
CO2 SERPL-SCNC: 24 MMOL/L
COLOR: YELLOW
CREAT SERPL-MCNC: 0.55 MG/DL
EOSINOPHIL # BLD AUTO: 0.07 K/UL
EOSINOPHIL NFR BLD AUTO: 1.2 %
ESTIMATED AVERAGE GLUCOSE: 131 MG/DL
GLUCOSE QUALITATIVE U: NEGATIVE
GLUCOSE SERPL-MCNC: 117 MG/DL
HBA1C MFR BLD HPLC: 6.2 %
HCT VFR BLD CALC: 39 %
HDLC SERPL-MCNC: 47 MG/DL
HGB BLD-MCNC: 13.1 G/DL
IMM GRANULOCYTES NFR BLD AUTO: 0.3 %
KETONES URINE: NEGATIVE
LDLC SERPL CALC-MCNC: 94 MG/DL
LEUKOCYTE ESTERASE URINE: NEGATIVE
LYMPHOCYTES # BLD AUTO: 1.97 K/UL
LYMPHOCYTES NFR BLD AUTO: 33.5 %
MAN DIFF?: NORMAL
MCHC RBC-ENTMCNC: 33.2 PG
MCHC RBC-ENTMCNC: 33.6 GM/DL
MCV RBC AUTO: 99 FL
MONOCYTES # BLD AUTO: 0.51 K/UL
MONOCYTES NFR BLD AUTO: 8.7 %
NEUTROPHILS # BLD AUTO: 3.29 K/UL
NEUTROPHILS NFR BLD AUTO: 56 %
NITRITE URINE: NEGATIVE
NONHDLC SERPL-MCNC: 132 MG/DL
PH URINE: 8
PLATELET # BLD AUTO: 159 K/UL
POTASSIUM SERPL-SCNC: 4.3 MMOL/L
PROT SERPL-MCNC: 7.2 G/DL
PROTEIN URINE: NEGATIVE
RBC # BLD: 3.94 M/UL
RBC # FLD: 12.2 %
SODIUM SERPL-SCNC: 139 MMOL/L
SPECIFIC GRAVITY URINE: 1.02
TRIGL SERPL-MCNC: 189 MG/DL
TSH SERPL-ACNC: 2.82 UIU/ML
UROBILINOGEN URINE: NORMAL
WBC # FLD AUTO: 5.88 K/UL

## 2021-11-02 ENCOUNTER — RX RENEWAL (OUTPATIENT)
Age: 86
End: 2021-11-02

## 2021-11-05 RX ORDER — MULTIVITAMIN WITH FOLIC ACID 400 MCG
TABLET ORAL DAILY
Qty: 90 | Refills: 3 | Status: ACTIVE | COMMUNITY
Start: 2017-12-14 | End: 1900-01-01

## 2022-04-04 ENCOUNTER — NON-APPOINTMENT (OUTPATIENT)
Age: 87
End: 2022-04-04

## 2022-04-04 ENCOUNTER — APPOINTMENT (OUTPATIENT)
Dept: CARDIOLOGY | Facility: CLINIC | Age: 87
End: 2022-04-04
Payer: MEDICARE

## 2022-04-04 VITALS
BODY MASS INDEX: 34.58 KG/M2 | RESPIRATION RATE: 17 BRPM | TEMPERATURE: 97.5 F | DIASTOLIC BLOOD PRESSURE: 73 MMHG | OXYGEN SATURATION: 96 % | HEART RATE: 78 BPM | SYSTOLIC BLOOD PRESSURE: 149 MMHG | WEIGHT: 183 LBS

## 2022-04-04 DIAGNOSIS — M25.559 PAIN IN UNSPECIFIED HIP: ICD-10-CM

## 2022-04-04 PROCEDURE — 99214 OFFICE O/P EST MOD 30 MIN: CPT

## 2022-04-04 PROCEDURE — 93000 ELECTROCARDIOGRAM COMPLETE: CPT

## 2022-04-04 NOTE — REASON FOR VISIT
[FreeTextEntry1] : 85-year-old female with HTN, DM, HLD presents for followup. \par \par Patient was last seen on 1/31/21 .  \par \par She is on Losartan 25 mg, Metoprolol 25 mg BID, and HCTZ 25 mg for HTN. She is on Simvastatin 5 mg for HLD.  She is on Metformin 500 mg for DM (?).\par \par

## 2022-04-04 NOTE — HISTORY OF PRESENT ILLNESS
[FreeTextEntry1] : 4/4/22 - Patient has L hip pain 9/10 that is not tender but she cannot change position and hard to walk since yesterday. \par \par \par 9/30/21 - \par \par BT\par flu shot\par declined echo STR\par may need holter in the future\par \par 1/31/21 - Patient has been stable.  Patient is not taking Metformin yet because she wants to try diet.  Patient denies CP, SOB, palpitations, or lightheadedness.  She is taking Metoprolol 25 mg BID.  I will check BT today.  Her BP was good.  Patient requests Metoprolol 25 mg BID instead of Metoprolol ER 50 mg.\par \par 8/24/20 - Patient is doing well. She declined mammogram and bone density test. She is on diuretics, Lipitor 5 mg, and Losartan. Patient reports  at home. I advised patient to increase dose of Metoprolol to 50 mg. I advised patient to get flu shot. Fu in 6 months.

## 2022-05-05 RX ORDER — CELECOXIB 200 MG/1
200 CAPSULE ORAL DAILY
Qty: 30 | Refills: 0 | Status: ACTIVE | COMMUNITY
Start: 2022-04-04 | End: 1900-01-01

## 2022-05-11 DIAGNOSIS — U07.1 COVID-19: ICD-10-CM

## 2022-05-11 RX ORDER — NIRMATRELVIR AND RITONAVIR 300-100 MG
20 X 150 MG & KIT ORAL
Qty: 1 | Refills: 0 | Status: ACTIVE | COMMUNITY
Start: 2022-05-11 | End: 1900-01-01

## 2022-10-05 ENCOUNTER — APPOINTMENT (OUTPATIENT)
Dept: CARDIOLOGY | Facility: CLINIC | Age: 87
End: 2022-10-05

## 2022-10-05 VITALS
OXYGEN SATURATION: 95 % | TEMPERATURE: 96.8 F | SYSTOLIC BLOOD PRESSURE: 154 MMHG | WEIGHT: 178 LBS | DIASTOLIC BLOOD PRESSURE: 73 MMHG | BODY MASS INDEX: 33.63 KG/M2 | RESPIRATION RATE: 16 BRPM | HEART RATE: 81 BPM

## 2022-10-05 PROCEDURE — 99214 OFFICE O/P EST MOD 30 MIN: CPT

## 2022-10-05 NOTE — HISTORY OF PRESENT ILLNESS
[FreeTextEntry1] : 10/5/22 - Patient has been stable.  Patient denies CP, SOB, palpitations, or lightheadedness.  Her BP was elevated.  Patient reports home SBP around 140-150.  I advised patient to increase Metoprolol ER to 75 mg QD.  I advised patient to check bone density.  She got flu shot already.  I will check BT.\par \par 4/4/22 - Patient has L hip pain 9/10 that is not tender but she cannot change position and hard to walk since yesterday.  I advised patient to try Celebrex.\par \par \par 9/30/21 - Patient reports that last Monday she experienced left-sided back pain from midnight to 1 AM.  Patient denies exertional back pain.  Patient denies CP.  Patient reports MOSHER.  Patient denies palpitations.  Patient denies lightheadedness.  Patient denies h/o syncope.  She is not taking Metformin.  2/6 MAZIN noted diffusely.  ECG showed NSR and APC's.  Will check BT.  Get flu shot.  Patient declined echo STR.  She may need holter in the future.\par \par 1/31/21 - Patient has been stable.  Patient is not taking Metformin yet because she wants to try diet.  Patient denies CP, SOB, palpitations, or lightheadedness.  She is taking Metoprolol 25 mg BID.  I will check BT today.  Her BP was good.  Patient requests Metoprolol 25 mg BID instead of Metoprolol ER 50 mg.\par \par 8/24/20 - Patient is doing well. She declined mammogram and bone density test. She is on diuretics, Lipitor 5 mg, and Losartan. Patient reports  at home. I advised patient to increase dose of Metoprolol to 50 mg. I advised patient to get flu shot. Fu in 6 months.

## 2022-10-05 NOTE — REASON FOR VISIT
[FreeTextEntry1] : 87-year-old female with HTN, DM, HLD presents for followup. \par \par Patient was last seen on 4/4/22 for L hip pain.  I advised patient to try Celebrex.\par \par She is on Losartan 50 mg, Metoprolol 25 mg BID, and HCTZ 25 mg for HTN. She is on Simvastatin 5 mg for HLD.   She is on Alendronate for osteoporosis.\par \par

## 2022-10-06 LAB
25(OH)D3 SERPL-MCNC: 78 NG/ML
ALBUMIN SERPL ELPH-MCNC: 4.5 G/DL
ALP BLD-CCNC: 54 U/L
ALT SERPL-CCNC: 26 U/L
ANION GAP SERPL CALC-SCNC: 12 MMOL/L
AST SERPL-CCNC: 36 U/L
BASOPHILS # BLD AUTO: 0.03 K/UL
BASOPHILS NFR BLD AUTO: 0.6 %
BILIRUB SERPL-MCNC: 0.7 MG/DL
BUN SERPL-MCNC: 15 MG/DL
CALCIUM SERPL-MCNC: 9 MG/DL
CHLORIDE SERPL-SCNC: 102 MMOL/L
CHOLEST SERPL-MCNC: 189 MG/DL
CO2 SERPL-SCNC: 27 MMOL/L
CREAT SERPL-MCNC: 0.58 MG/DL
EGFR: 89 ML/MIN/1.73M2
EOSINOPHIL # BLD AUTO: 0.05 K/UL
EOSINOPHIL NFR BLD AUTO: 1.1 %
ESTIMATED AVERAGE GLUCOSE: 140 MG/DL
GLUCOSE SERPL-MCNC: 161 MG/DL
HBA1C MFR BLD HPLC: 6.5 %
HCT VFR BLD CALC: 39.8 %
HDLC SERPL-MCNC: 49 MG/DL
HGB BLD-MCNC: 13.1 G/DL
IMM GRANULOCYTES NFR BLD AUTO: 0.2 %
LDLC SERPL CALC-MCNC: 111 MG/DL
LYMPHOCYTES # BLD AUTO: 1.32 K/UL
LYMPHOCYTES NFR BLD AUTO: 27.7 %
MAN DIFF?: NORMAL
MCHC RBC-ENTMCNC: 32.2 PG
MCHC RBC-ENTMCNC: 32.9 GM/DL
MCV RBC AUTO: 97.8 FL
MONOCYTES # BLD AUTO: 0.31 K/UL
MONOCYTES NFR BLD AUTO: 6.5 %
NEUTROPHILS # BLD AUTO: 3.04 K/UL
NEUTROPHILS NFR BLD AUTO: 63.9 %
NONHDLC SERPL-MCNC: 140 MG/DL
PLATELET # BLD AUTO: 168 K/UL
POTASSIUM SERPL-SCNC: 4.5 MMOL/L
PROT SERPL-MCNC: 7.3 G/DL
RBC # BLD: 4.07 M/UL
RBC # FLD: 12.9 %
SODIUM SERPL-SCNC: 140 MMOL/L
TRIGL SERPL-MCNC: 146 MG/DL
TSH SERPL-ACNC: 2.27 UIU/ML
WBC # FLD AUTO: 4.76 K/UL

## 2022-10-26 RX ORDER — ALENDRONATE SODIUM 70 MG/1
70 TABLET ORAL
Qty: 12 | Refills: 3 | Status: DISCONTINUED | COMMUNITY
Start: 2017-11-21 | End: 2022-10-26

## 2023-01-04 ENCOUNTER — NON-APPOINTMENT (OUTPATIENT)
Age: 88
End: 2023-01-04

## 2023-01-04 ENCOUNTER — APPOINTMENT (OUTPATIENT)
Dept: CARDIOLOGY | Facility: CLINIC | Age: 88
End: 2023-01-04
Payer: MEDICARE

## 2023-01-04 VITALS
TEMPERATURE: 97.8 F | HEART RATE: 58 BPM | OXYGEN SATURATION: 96 % | BODY MASS INDEX: 34.2 KG/M2 | WEIGHT: 181 LBS | RESPIRATION RATE: 16 BRPM | DIASTOLIC BLOOD PRESSURE: 75 MMHG | SYSTOLIC BLOOD PRESSURE: 164 MMHG

## 2023-01-04 PROCEDURE — 99214 OFFICE O/P EST MOD 30 MIN: CPT | Mod: 25

## 2023-01-04 PROCEDURE — 93000 ELECTROCARDIOGRAM COMPLETE: CPT

## 2023-01-04 RX ORDER — ICOSAPENT ETHYL 1 G/1
1 CAPSULE ORAL
Qty: 360 | Refills: 3 | Status: ACTIVE | COMMUNITY
Start: 2020-01-15 | End: 1900-01-01

## 2023-01-05 NOTE — DISCUSSION/SUMMARY
[FreeTextEntry1] : 87-year-old female with HTN, DM, HLD presents for followup. \par \par Patient was last seen on 10/5/22 for followup.  Her BP was elevated.  Patient reported home SBP around 140-150.  I advised patient to increase Metoprolol ER to 75 mg QD.   \par \par She is on Losartan 50 mg, Metoprolol 75 mg QD, and HCTZ 25 mg for HTN.  She is on Simvastatin 5 mg for HLD.   She is on ASA 81 mg for cardioprotection.  She is OFF Alendronate for osteoporosis.\par \par 1/4/23 - Patient needs cardiac clearance prior to cataract surgery.  Patient has been stable.  Patient denies CP, SOB, palpitations, or lightheadedness.  Her BP was elevated because she often misses noon doses of Metoprolol ER and Losartan 50 mg.  I advised patient to take Metoprolol ER 75 mg all at once instead of 25 mg TID.  I advised patient to take Losartan 50 mg either in AM or in PM so she won't miss the dose.  Patient is cleared for surgery and anesthesia.  She may hold ASA 81 mg for 7 days prior to surgery if needed.\par \par (1) Cardiac clearance prior to cataract surgery - Patient reports no cardiac history.  Patient denies exertional symptoms.  ECG showed no ischemic changes.  Patient is cleared for surgery and anesthesia.  She may hold ASA 81 mg for 7 days prior to surgery if needed.\par \par (2) HTN -  Her BP was elevated because she often misses noon doses of Metoprolol ER and Losartan 50 mg.  I advised patient to take Metoprolol ER 75 mg all at once instead of 25 mg TID.  I advised patient to take Losartan 50 mg either in AM or in PM so she won't miss the dose.  I advised patient to continue HCTZ 25 mg.  \par \par (3) DM - Her recent A1C was 6.5.  I advised patient to limit carbohydrate intake and exercise more. \par \par (4) HLD - Her recent LDL was 111.  I advised patient to continue Simvastatin.\par \par (5) Followup - 3 months. [EKG obtained to assist in diagnosis and management of assessed problem(s)] : EKG obtained to assist in diagnosis and management of assessed problem(s)

## 2023-01-05 NOTE — REASON FOR VISIT
[FreeTextEntry1] : 87-year-old female with HTN, DM, HLD presents for followup. \par \par Patient was last seen on 10/5/22 for followup.  Her BP was elevated.  Patient reported home SBP around 140-150.  I advised patient to increase Metoprolol ER to 75 mg QD.   \par \par She is on Losartan 50 mg, Metoprolol 75 mg QD, and HCTZ 25 mg for HTN.  She is on Simvastatin 5 mg for HLD.   She is on ASA 81 mg for cardioprotection.  She is OFF Alendronate for osteoporosis.\par \par

## 2023-04-24 PROBLEM — Z01.818 PREOPERATIVE CLEARANCE: Status: ACTIVE | Noted: 2023-01-04

## 2023-04-26 ENCOUNTER — APPOINTMENT (OUTPATIENT)
Dept: CARDIOLOGY | Facility: CLINIC | Age: 88
End: 2023-04-26
Payer: MEDICARE

## 2023-04-26 VITALS
DIASTOLIC BLOOD PRESSURE: 69 MMHG | HEART RATE: 64 BPM | RESPIRATION RATE: 18 BRPM | SYSTOLIC BLOOD PRESSURE: 165 MMHG | BODY MASS INDEX: 35.71 KG/M2 | WEIGHT: 189 LBS | TEMPERATURE: 97 F | OXYGEN SATURATION: 97 %

## 2023-04-26 DIAGNOSIS — Z01.818 ENCOUNTER FOR OTHER PREPROCEDURAL EXAMINATION: ICD-10-CM

## 2023-04-26 PROCEDURE — 99214 OFFICE O/P EST MOD 30 MIN: CPT

## 2023-04-26 NOTE — REASON FOR VISIT
[FreeTextEntry1] : 87-year-old female with HTN, DM, HLD presents for followup. \par \par Patient was last seen on 1/4/23 for followup.  Patient needed cardiac clearance prior to cataract surgery.  Patient was stable.  Her BP was elevated because she often missed noon doses of Metoprolol ER and Losartan 50 mg.  I advised patient to take Metoprolol ER 75 mg all at once instead of 25 mg TID.  I advised patient to take Losartan 50 mg either in AM or in PM so she won't miss the dose.  Patient was cleared for surgery and anesthesia.  She may hold ASA 81 mg for 7 days prior to surgery if needed.\par \par She is on Losartan 50 mg, Metoprolol 75 mg QD, and HCTZ 25 mg for HTN.  She is on Simvastatin 5 mg for HLD.   She is on ASA 81 mg for cardioprotection.  She is OFF Alendronate for osteoporosis.\par \par

## 2023-04-26 NOTE — DISCUSSION/SUMMARY
[FreeTextEntry1] : 87-year-old female with HTN, DM, HLD presents for followup. \par \par Patient was last seen on 1/4/23 for followup.  Patient needed cardiac clearance prior to cataract surgery.  Patient was stable.  Her BP was elevated because she often missed noon doses of Metoprolol ER and Losartan 50 mg.  I advised patient to take Metoprolol ER 75 mg all at once instead of 25 mg TID.  I advised patient to take Losartan 50 mg either in AM or in PM so she won't miss the dose.  Patient was cleared for surgery and anesthesia.  She may hold ASA 81 mg for 7 days prior to surgery if needed.\par \par She is on Losartan 50 mg, Metoprolol 75 mg QD, and HCTZ 25 mg for HTN.  She is on Simvastatin 5 mg for HLD.   She is on ASA 81 mg for cardioprotection.  She is OFF Alendronate for osteoporosis.\par \par 4/26/23 - Patient needs cardiac clearance prior to cataract surgery.  Patient has been stable.  Patient denies CP, SOB, or palpitations.  Her BP was elevated because she did not make the changes in medications as discussed with her at the last visit.  She was still often missing the dose of Losartan 50 mg at noon as well as the Metoprolol ER 25 mg at noon.  Again I advised her to take Metoprolol ER 75 mg all at once in AM instead of 25 mg TID.  I advised patient to take Losartan 50 mg either in AM so she won't miss the dose.  Patient is otherwise cleared for surgery and anesthesia.  She may hold ASA 81 mg for 7 days prior to surgery if needed.\par \par (1) Cardiac clearance prior to cataract surgery - Patient reports no cardiac history. Patient denies exertional symptoms. ECG showed no ischemic changes. Patient is cleared for surgery and anesthesia. She may hold ASA 81 mg for 7 days prior to surgery if needed.\par \par (2) HTN - Her BP was elevated because she did not make the changes in medications as discussed with her at the last visit.  She was still often missing the dose of Losartan 50 mg at noon as well as the Metoprolol ER 25 mg at noon.  Again I advised her to take Metoprolol ER 75 mg all at once in AM instead of 25 mg TID.  I advised patient to take Losartan 50 mg either in AM so she won't miss the dose.  I advised patient to continue HCTZ 25 mg. \par \par (3) DM - Her recent A1C was 6.5. I advised patient to limit carbohydrate intake and exercise more. \par \par (4) HLD - Her recent LDL was 111. I advised patient to continue Simvastatin.\par \par (5) Followup - 1 month.

## 2023-04-26 NOTE — HISTORY OF PRESENT ILLNESS
[FreeTextEntry1] : 4/26/23 - Patient needs cardiac clearance prior to cataract surgery.  Patient has been stable.  Patient denies CP, SOB, or palpitations.  Her BP was elevated because she did not make the changes in medications as discussed with her at the last visit.  She was still often missing the dose of Losartan 50 mg at noon as well as the Metoprolol ER 25 mg at noon.  Again I advised her to take Metoprolol ER 75 mg all at once in AM instead of 25 mg TID.  I advised patient to take Losartan 50 mg either in AM so she won't miss the dose.  Patient is otherwise cleared for surgery and anesthesia.  She may hold ASA 81 mg for 7 days prior to surgery if needed.\par \par 1/4/23 - Patient needs cardiac clearance prior to cataract surgery.  Patient has been stable.  Patient denies CP, SOB, palpitations, or lightheadedness.  Her BP was elevated because she often misses noon doses of Metoprolol ER and Losartan 50 mg.  I advised patient to take Metoprolol ER 75 mg all at once instead of 25 mg TID.  I advised patient to take Losartan 50 mg either in AM or in PM so she won't miss the dose.  Patient is cleared for surgery and anesthesia.  She may hold ASA 81 mg for 7 days prior to surgery if needed.\par \par 10/5/22 - Patient has been stable.  Patient denies CP, SOB, palpitations, or lightheadedness.  Her BP was elevated.  Patient reports home SBP around 140-150.  I advised patient to increase Metoprolol ER to 75 mg QD.  I advised patient to check bone density.  She got flu shot already.  I will check BT.  A1C 6.5.  .\par \par 4/4/22 - Patient has L hip pain 9/10 that is not tender but she cannot change position and hard to walk since yesterday.  I advised patient to try Celebrex.\par \par \par 9/30/21 - Patient reports that last Monday she experienced left-sided back pain from midnight to 1 AM.  Patient denies exertional back pain.  Patient denies CP.  Patient reports MOSHER.  Patient denies palpitations.  Patient denies lightheadedness.  Patient denies h/o syncope.  She is not taking Metformin.  2/6 MAZIN noted diffusely.  ECG showed NSR and APC's.  Will check BT.  Get flu shot.  Patient declined echo STR.  She may need holter in the future.\par \par 1/31/21 - Patient has been stable.  Patient is not taking Metformin yet because she wants to try diet.  Patient denies CP, SOB, palpitations, or lightheadedness.  She is taking Metoprolol 25 mg BID.  I will check BT today.  Her BP was good.  Patient requests Metoprolol 25 mg BID instead of Metoprolol ER 50 mg.\par \par 8/24/20 - Patient is doing well. She declined mammogram and bone density test. She is on diuretics, Lipitor 5 mg, and Losartan. Patient reports  at home. I advised patient to increase dose of Metoprolol to 50 mg. I advised patient to get flu shot. Fu in 6 months.

## 2023-10-03 ENCOUNTER — APPOINTMENT (OUTPATIENT)
Dept: CARDIOLOGY | Facility: CLINIC | Age: 88
End: 2023-10-03
Payer: MEDICARE

## 2023-10-03 VITALS
BODY MASS INDEX: 34.2 KG/M2 | SYSTOLIC BLOOD PRESSURE: 157 MMHG | RESPIRATION RATE: 16 BRPM | WEIGHT: 181 LBS | DIASTOLIC BLOOD PRESSURE: 73 MMHG | OXYGEN SATURATION: 95 % | HEART RATE: 60 BPM | TEMPERATURE: 96.8 F

## 2023-10-03 PROCEDURE — 99214 OFFICE O/P EST MOD 30 MIN: CPT

## 2023-10-03 RX ORDER — MULTIVIT-MIN/FOLIC/VIT K/LYCOP 400-300MCG
1000 TABLET ORAL DAILY
Qty: 90 | Refills: 3 | Status: ACTIVE | COMMUNITY
Start: 2023-10-03 | End: 1900-01-01

## 2023-10-03 RX ORDER — B-COMPLEX WITH VITAMIN C
TABLET ORAL DAILY
Qty: 90 | Refills: 3 | Status: ACTIVE | COMMUNITY
Start: 2017-03-23 | End: 1900-01-01

## 2023-10-03 RX ORDER — ICOSAPENT ETHYL 1000 MG/1
1 CAPSULE ORAL TWICE DAILY
Qty: 360 | Refills: 3 | Status: ACTIVE | COMMUNITY
Start: 2023-01-13 | End: 1900-01-01

## 2023-10-09 LAB
25(OH)D3 SERPL-MCNC: 58.1 NG/ML
ALBUMIN SERPL ELPH-MCNC: 4.3 G/DL
ALP BLD-CCNC: 82 U/L
ALT SERPL-CCNC: 51 U/L
ANION GAP SERPL CALC-SCNC: 17 MMOL/L
AST SERPL-CCNC: 51 U/L
BILIRUB SERPL-MCNC: 0.4 MG/DL
BUN SERPL-MCNC: 16 MG/DL
CALCIUM SERPL-MCNC: 9.3 MG/DL
CHLORIDE SERPL-SCNC: 98 MMOL/L
CHOLEST SERPL-MCNC: 194 MG/DL
CO2 SERPL-SCNC: 24 MMOL/L
CREAT SERPL-MCNC: 0.6 MG/DL
EGFR: 87 ML/MIN/1.73M2
ESTIMATED AVERAGE GLUCOSE: 154 MG/DL
GLUCOSE SERPL-MCNC: 118 MG/DL
HBA1C MFR BLD HPLC: 7 %
HCT VFR BLD CALC: 38.6 %
HDLC SERPL-MCNC: 44 MG/DL
HGB BLD-MCNC: 13.3 G/DL
LDLC SERPL CALC-MCNC: 104 MG/DL
MCHC RBC-ENTMCNC: 33.3 PG
MCHC RBC-ENTMCNC: 34.5 GM/DL
MCV RBC AUTO: 96.7 FL
NONHDLC SERPL-MCNC: 151 MG/DL
PLATELET # BLD AUTO: 147 K/UL
POTASSIUM SERPL-SCNC: 4.2 MMOL/L
PROT SERPL-MCNC: 7.3 G/DL
RBC # BLD: 3.99 M/UL
RBC # FLD: 12.3 %
SODIUM SERPL-SCNC: 139 MMOL/L
TRIGL SERPL-MCNC: 276 MG/DL
TSH SERPL-ACNC: 2.09 UIU/ML
WBC # FLD AUTO: 5.99 K/UL

## 2023-12-15 ENCOUNTER — RX RENEWAL (OUTPATIENT)
Age: 88
End: 2023-12-15

## 2024-01-24 ENCOUNTER — APPOINTMENT (OUTPATIENT)
Dept: CARDIOLOGY | Facility: CLINIC | Age: 89
End: 2024-01-24
Payer: MEDICARE

## 2024-01-24 ENCOUNTER — NON-APPOINTMENT (OUTPATIENT)
Age: 89
End: 2024-01-24

## 2024-01-24 VITALS
TEMPERATURE: 96.8 F | RESPIRATION RATE: 18 BRPM | BODY MASS INDEX: 34.77 KG/M2 | OXYGEN SATURATION: 95 % | WEIGHT: 184 LBS | SYSTOLIC BLOOD PRESSURE: 144 MMHG | DIASTOLIC BLOOD PRESSURE: 77 MMHG | HEART RATE: 65 BPM

## 2024-01-24 PROCEDURE — 99213 OFFICE O/P EST LOW 20 MIN: CPT

## 2024-01-25 LAB
ALBUMIN SERPL ELPH-MCNC: 4.3 G/DL
ALP BLD-CCNC: 87 U/L
ALT SERPL-CCNC: 56 U/L
ANION GAP SERPL CALC-SCNC: 15 MMOL/L
AST SERPL-CCNC: 49 U/L
BILIRUB SERPL-MCNC: 0.5 MG/DL
BUN SERPL-MCNC: 18 MG/DL
CALCIUM SERPL-MCNC: 10 MG/DL
CHLORIDE SERPL-SCNC: 98 MMOL/L
CHOLEST SERPL-MCNC: 177 MG/DL
CO2 SERPL-SCNC: 26 MMOL/L
CREAT SERPL-MCNC: 0.63 MG/DL
EGFR: 86 ML/MIN/1.73M2
ESTIMATED AVERAGE GLUCOSE: 160 MG/DL
GLUCOSE SERPL-MCNC: 265 MG/DL
HBA1C MFR BLD HPLC: 7.2 %
HDLC SERPL-MCNC: 55 MG/DL
LDLC SERPL CALC-MCNC: 98 MG/DL
NONHDLC SERPL-MCNC: 123 MG/DL
POTASSIUM SERPL-SCNC: 4.5 MMOL/L
PROT SERPL-MCNC: 7.2 G/DL
SODIUM SERPL-SCNC: 138 MMOL/L
TRIGL SERPL-MCNC: 145 MG/DL

## 2024-01-25 RX ORDER — METFORMIN ER 500 MG 500 MG/1
500 TABLET ORAL
Qty: 180 | Refills: 1 | Status: ACTIVE | COMMUNITY
Start: 2019-04-04 | End: 1900-01-01

## 2024-01-30 NOTE — REASON FOR VISIT
[FreeTextEntry1] : 88 year-old female with HTN, DM, HLD, presents for followup.   Patient was last seen on 10/3/23 - Patient has been stable.  Patient denies CP, SOB, palpitations, or lightheadedness.  Patient reports home SBP around 145.  BP elevated 157/73.  Patient has trace LE edema.  I advised patient to increase Losartan to 100 mg.  Will check BT.  FU 6 months.  Elevated 3-month average sugar level (7.0<6.5).  I advised patient to start Metformin.  She is on Losartan 100 mg, Metoprolol 75 mg QD, and HCTZ 25 mg for HTN.  She is on Simvastatin 5 mg for HLD.   She is on ASA 81 mg for cardioprotection.  She is OFF Alendronate for osteoporosis.

## 2024-01-30 NOTE — HISTORY OF PRESENT ILLNESS
[FreeTextEntry1] : 1/24/24 - Patient reports home BP of 140. Patient has 1+ edema. I advised patient to check BT.   10/3/23 - Patient has been stable.  Patient denies CP, SOB, palpitations, or lightheadedness.  Patient reports home SBP around 145.  BP elevated 157/73.  Patient has trace LE edema.  I advised patient to increase Losartan to 100 mg.  Will check BT.  FU 6 months.  Elevated 3-month average sugar level (7.0<6.5).  I advised patient to start Metformin.  4/26/23 - Patient needs cardiac clearance prior to cataract surgery.  Patient has been stable.  Patient denies CP, SOB, or palpitations.  Her BP was elevated because she did not make the changes in medications as discussed with her at the last visit.  She was still often missing the dose of Losartan 50 mg at noon as well as the Metoprolol ER 25 mg at noon.  Again I advised her to take Metoprolol ER 75 mg all at once in AM instead of 25 mg TID.  I advised patient to take Losartan 50 mg either in AM so she won't miss the dose.  Patient is otherwise cleared for surgery and anesthesia.  She may hold ASA 81 mg for 7 days prior to surgery if needed.  1/4/23 - Patient needs cardiac clearance prior to cataract surgery.  Patient has been stable.  Patient denies CP, SOB, palpitations, or lightheadedness.  Her BP was elevated because she often misses noon doses of Metoprolol ER and Losartan 50 mg.  I advised patient to take Metoprolol ER 75 mg all at once instead of 25 mg TID.  I advised patient to take Losartan 50 mg either in AM or in PM so she won't miss the dose.  Patient is cleared for surgery and anesthesia.  She may hold ASA 81 mg for 7 days prior to surgery if needed.  10/5/22 - Patient has been stable.  Patient denies CP, SOB, palpitations, or lightheadedness.  Her BP was elevated.  Patient reports home SBP around 140-150.  I advised patient to increase Metoprolol ER to 75 mg QD.  I advised patient to check bone density.  She got flu shot already.  I will check BT.  A1C 6.5.  .  4/4/22 - Patient has L hip pain 9/10 that is not tender but she cannot change position and hard to walk since yesterday.  I advised patient to try Celebrex.   9/30/21 - Patient reports that last Monday she experienced left-sided back pain from midnight to 1 AM.  Patient denies exertional back pain.  Patient denies CP.  Patient reports MOSHER.  Patient denies palpitations.  Patient denies lightheadedness.  Patient denies h/o syncope.  She is not taking Metformin.  2/6 MAZIN noted diffusely.  ECG showed NSR and APC's.  Will check BT.  Get flu shot.  Patient declined echo STR.  She may need holter in the future.  1/31/21 - Patient has been stable.  Patient is not taking Metformin yet because she wants to try diet.  Patient denies CP, SOB, palpitations, or lightheadedness.  She is taking Metoprolol 25 mg BID.  I will check BT today.  Her BP was good.  Patient requests Metoprolol 25 mg BID instead of Metoprolol ER 50 mg.  8/24/20 - Patient is doing well. She declined mammogram and bone density test. She is on diuretics, Lipitor 5 mg, and Losartan. Patient reports  at home. I advised patient to increase dose of Metoprolol to 50 mg. I advised patient to get flu shot. Fu in 6 months.

## 2024-01-30 NOTE — PHYSICAL EXAM
[General Appearance - Well Developed] : well developed [Normal Appearance] : normal appearance [General Appearance - Well Nourished] : well nourished [Well Groomed] : well groomed [No Deformities] : no deformities [Normal Conjunctiva] : the conjunctiva exhibited no abnormalities [General Appearance - In No Acute Distress] : no acute distress [Eyelids - No Xanthelasma] : the eyelids demonstrated no xanthelasmas [Normal Oral Mucosa] : normal oral mucosa [No Oral Pallor] : no oral pallor [No Oral Cyanosis] : no oral cyanosis [Normal Jugular Venous A Waves Present] : normal jugular venous A waves present [No Jugular Venous Martinez A Waves] : no jugular venous martinez A waves [Normal Jugular Venous V Waves Present] : normal jugular venous V waves present [Respiration, Rhythm And Depth] : normal respiratory rhythm and effort [Exaggerated Use Of Accessory Muscles For Inspiration] : no accessory muscle use [Auscultation Breath Sounds / Voice Sounds] : lungs were clear to auscultation bilaterally [Scattered Wheezes] : scattered wheezing [Heart Rate And Rhythm] : heart rate and rhythm were normal [Heart Sounds] : normal S1 and S2 [Arterial Pulses Normal] : the arterial pulses were normal [Murmurs] : no murmurs present [Abdomen Soft] : soft [Abdomen Tenderness] : non-tender [Abdomen Mass (___ Cm)] : no abdominal mass palpated [Abnormal Walk] : normal gait [Gait - Sufficient For Exercise Testing] : the gait was sufficient for exercise testing [Nail Clubbing] : no clubbing of the fingernails [Cyanosis, Localized] : no localized cyanosis [Petechial Hemorrhages (___cm)] : no petechial hemorrhages [] : no ischemic changes [Oriented To Time, Place, And Person] : oriented to person, place, and time [Affect] : the affect was normal [Mood] : the mood was normal [No Anxiety] : not feeling anxious [FreeTextEntry1] : limited mobility.

## 2024-04-01 ENCOUNTER — NON-APPOINTMENT (OUTPATIENT)
Age: 89
End: 2024-04-01

## 2024-04-01 DIAGNOSIS — K59.00 CONSTIPATION, UNSPECIFIED: ICD-10-CM

## 2024-04-01 RX ORDER — LORATADINE 10 MG
17 TABLET,DISINTEGRATING ORAL DAILY
Qty: 1 | Refills: 0 | Status: ACTIVE | COMMUNITY
Start: 2024-04-01 | End: 1900-01-01

## 2024-05-08 PROBLEM — E11.9 DIABETES MELLITUS: Status: ACTIVE | Noted: 2019-04-04

## 2024-05-09 ENCOUNTER — RX RENEWAL (OUTPATIENT)
Age: 89
End: 2024-05-09

## 2024-05-09 ENCOUNTER — APPOINTMENT (OUTPATIENT)
Dept: CARDIOLOGY | Facility: CLINIC | Age: 89
End: 2024-05-09
Payer: MEDICARE

## 2024-05-09 VITALS
WEIGHT: 184 LBS | HEART RATE: 59 BPM | OXYGEN SATURATION: 95 % | DIASTOLIC BLOOD PRESSURE: 80 MMHG | SYSTOLIC BLOOD PRESSURE: 154 MMHG | RESPIRATION RATE: 18 BRPM | BODY MASS INDEX: 34.77 KG/M2

## 2024-05-09 DIAGNOSIS — I10 ESSENTIAL (PRIMARY) HYPERTENSION: ICD-10-CM

## 2024-05-09 DIAGNOSIS — E78.5 HYPERLIPIDEMIA, UNSPECIFIED: ICD-10-CM

## 2024-05-09 DIAGNOSIS — E11.9 TYPE 2 DIABETES MELLITUS W/OUT COMPLICATIONS: ICD-10-CM

## 2024-05-09 DIAGNOSIS — R60.9 EDEMA, UNSPECIFIED: ICD-10-CM

## 2024-05-09 PROCEDURE — 99213 OFFICE O/P EST LOW 20 MIN: CPT

## 2024-05-09 RX ORDER — FOLIC ACID 1 MG/1
1 TABLET ORAL
Qty: 90 | Refills: 3 | Status: ACTIVE | COMMUNITY
Start: 2021-09-30 | End: 1900-01-01

## 2024-05-10 LAB
ALBUMIN SERPL ELPH-MCNC: 4.3 G/DL
ALP BLD-CCNC: 78 U/L
ALT SERPL-CCNC: 48 U/L
ANION GAP SERPL CALC-SCNC: 13 MMOL/L
AST SERPL-CCNC: 46 U/L
BILIRUB SERPL-MCNC: 0.4 MG/DL
BUN SERPL-MCNC: 24 MG/DL
CALCIUM SERPL-MCNC: 9.5 MG/DL
CHLORIDE SERPL-SCNC: 102 MMOL/L
CO2 SERPL-SCNC: 24 MMOL/L
CREAT SERPL-MCNC: 0.6 MG/DL
EGFR: 87 ML/MIN/1.73M2
ESTIMATED AVERAGE GLUCOSE: 160 MG/DL
GLUCOSE SERPL-MCNC: 161 MG/DL
HBA1C MFR BLD HPLC: 7.2 %
NT-PROBNP SERPL-MCNC: 54 PG/ML
POTASSIUM SERPL-SCNC: 3.8 MMOL/L
PROT SERPL-MCNC: 7 G/DL
SODIUM SERPL-SCNC: 140 MMOL/L

## 2024-05-10 RX ORDER — EMPAGLIFLOZIN 10 MG/1
10 TABLET, FILM COATED ORAL
Qty: 30 | Refills: 5 | Status: ACTIVE | COMMUNITY
Start: 2024-05-10 | End: 1900-01-01

## 2024-05-28 NOTE — REASON FOR VISIT
[FreeTextEntry1] : 88 year-old female with HTN, DM, HLD, presents for followup.   Patient was last seen on 1/24/24 - Patient reports home BP of 140. Patient has 1+ edema. I advised patient to check BT.  3-month average sugar level A1C is worse (7.2<7.0). I advised patient to increase Metformin ER to 1000 mg QD.  Normal LDL cholesterol 98.  Abnormal liver function likely due to fatty liver. Please limit carbohydrate intake and exercise more.  She is on Losartan 100 mg, Metoprolol 75 mg QD, and HCTZ 25 mg for HTN.  She is on Simvastatin 5 mg for HLD.   She is on ASA 81 mg for cardioprotection.  She is OFF Alendronate for osteoporosis.  She is on Metformin 1000 mg for DM.

## 2024-05-28 NOTE — HISTORY OF PRESENT ILLNESS
[FreeTextEntry1] : 5/9/24- Please refer to NP note below.  Pt came in for follow up. Pt reports bilateral inner knee pain for 2 months, usually when she sits, describes as pressure. Patient denies CP, SOB, palpitations, or lightheadedness. Patient denies h/o syncope. Today's /80 P 59, repeated /76 P 59. Pt is on Losartan 100 mg, Metoprolol 75 mg QD, and HCTZ 25 mg for HTN. She is on Simvastatin 5 mg for HLD. She is on ASA 81 mg for cardioprotection. She is on Metformin 1000 mg for DM.  Patient has trace edema.     1/24/24 - Patient reports home BP of 140. Patient has 1+ edema. I advised patient to check BT.  3-month average sugar level A1C is worse (7.2<7.0). I advised patient to increase Metformin ER to 1000 mg QD.  Normal LDL cholesterol 98.  Abnormal liver function likely due to fatty liver. Please limit carbohydrate intake and exercise more.  10/3/23 - Patient has been stable.  Patient denies CP, SOB, palpitations, or lightheadedness.  Patient reports home SBP around 145.  BP elevated 157/73.  Patient has trace LE edema.  I advised patient to increase Losartan to 100 mg.  Will check BT.  FU 6 months.  Elevated 3-month average sugar level (7.0<6.5).  I advised patient to start Metformin.  4/26/23 - Patient needs cardiac clearance prior to cataract surgery.  Patient has been stable.  Patient denies CP, SOB, or palpitations.  Her BP was elevated because she did not make the changes in medications as discussed with her at the last visit.  She was still often missing the dose of Losartan 50 mg at noon as well as the Metoprolol ER 25 mg at noon.  Again I advised her to take Metoprolol ER 75 mg all at once in AM instead of 25 mg TID.  I advised patient to take Losartan 50 mg either in AM so she won't miss the dose.  Patient is otherwise cleared for surgery and anesthesia.  She may hold ASA 81 mg for 7 days prior to surgery if needed.  1/4/23 - Patient needs cardiac clearance prior to cataract surgery.  Patient has been stable.  Patient denies CP, SOB, palpitations, or lightheadedness.  Her BP was elevated because she often misses noon doses of Metoprolol ER and Losartan 50 mg.  I advised patient to take Metoprolol ER 75 mg all at once instead of 25 mg TID.  I advised patient to take Losartan 50 mg either in AM or in PM so she won't miss the dose.  Patient is cleared for surgery and anesthesia.  She may hold ASA 81 mg for 7 days prior to surgery if needed.  10/5/22 - Patient has been stable.  Patient denies CP, SOB, palpitations, or lightheadedness.  Her BP was elevated.  Patient reports home SBP around 140-150.  I advised patient to increase Metoprolol ER to 75 mg QD.  I advised patient to check bone density.  She got flu shot already.  I will check BT.  A1C 6.5.  .  4/4/22 - Patient has L hip pain 9/10 that is not tender but she cannot change position and hard to walk since yesterday.  I advised patient to try Celebrex.   9/30/21 - Patient reports that last Monday she experienced left-sided back pain from midnight to 1 AM.  Patient denies exertional back pain.  Patient denies CP.  Patient reports MOSHER.  Patient denies palpitations.  Patient denies lightheadedness.  Patient denies h/o syncope.  She is not taking Metformin.  2/6 MAZIN noted diffusely.  ECG showed NSR and APC's.  Will check BT.  Get flu shot.  Patient declined echo STR.  She may need holter in the future.  1/31/21 - Patient has been stable.  Patient is not taking Metformin yet because she wants to try diet.  Patient denies CP, SOB, palpitations, or lightheadedness.  She is taking Metoprolol 25 mg BID.  I will check BT today.  Her BP was good.  Patient requests Metoprolol 25 mg BID instead of Metoprolol ER 50 mg.  8/24/20 - Patient is doing well. She declined mammogram and bone density test. She is on diuretics, Lipitor 5 mg, and Losartan. Patient reports  at home. I advised patient to increase dose of Metoprolol to 50 mg. I advised patient to get flu shot. Fu in 6 months.

## 2024-06-07 ENCOUNTER — NON-APPOINTMENT (OUTPATIENT)
Age: 89
End: 2024-06-07

## 2024-06-07 DIAGNOSIS — G47.62 SLEEP RELATED LEG CRAMPS: ICD-10-CM

## 2024-06-07 RX ORDER — MAGNESIUM OXIDE 241.3 MG/1000MG
400 TABLET ORAL
Qty: 30 | Refills: 5 | Status: ACTIVE | COMMUNITY
Start: 2024-06-07 | End: 1900-01-01

## 2024-07-05 ENCOUNTER — RX RENEWAL (OUTPATIENT)
Age: 89
End: 2024-07-05

## 2024-07-05 RX ORDER — ASPIRIN 81 MG/1
81 TABLET, COATED ORAL
Qty: 90 | Refills: 3 | Status: ACTIVE | COMMUNITY
Start: 2024-07-05 | End: 1900-01-01

## 2024-08-23 ENCOUNTER — APPOINTMENT (OUTPATIENT)
Dept: CARDIOLOGY | Facility: CLINIC | Age: 89
End: 2024-08-23
Payer: MEDICARE

## 2024-08-23 VITALS
DIASTOLIC BLOOD PRESSURE: 73 MMHG | OXYGEN SATURATION: 95 % | RESPIRATION RATE: 16 BRPM | SYSTOLIC BLOOD PRESSURE: 152 MMHG | HEART RATE: 73 BPM | WEIGHT: 181 LBS | BODY MASS INDEX: 34.2 KG/M2

## 2024-08-23 DIAGNOSIS — E11.9 TYPE 2 DIABETES MELLITUS W/OUT COMPLICATIONS: ICD-10-CM

## 2024-08-23 DIAGNOSIS — I10 ESSENTIAL (PRIMARY) HYPERTENSION: ICD-10-CM

## 2024-08-23 DIAGNOSIS — R60.9 EDEMA, UNSPECIFIED: ICD-10-CM

## 2024-08-23 PROCEDURE — 99213 OFFICE O/P EST LOW 20 MIN: CPT

## 2024-08-23 NOTE — REASON FOR VISIT
[FreeTextEntry1] : 89 year-old female with HTN, DM, HLD, presents for followup.   Patient was last seen on 5/9/24 -  Pt came in for follow up. Pt reports bilateral inner knee pain for 2 months, usually when she sits, describes as pressure. Patient denies CP, SOB, palpitations, or lightheadedness. Patient denies h/o syncope.  Pt is on Losartan 100 mg, Metoprolol 75 mg QD, and HCTZ 25 mg for HTN. She is on Simvastatin 5 mg for HLD. She is on ASA 81 mg for cardioprotection. She is on Metformin 1000 mg for DM.  Today's /80 P 59, repeated /76 P 59.  Patient has trace edema.  A1C 7.2.  I advised patient to start Jardiance 10 mg QD.  She is on Losartan 100 mg, Metoprolol 75 mg QD, and HCTZ 25 mg for HTN.  She is on Simvastatin 5 mg for HLD.   She is on ASA 81 mg for cardioprotection.  She is OFF Alendronate for osteoporosis.  She is on Metformin 1000 mg for DM.

## 2024-08-23 NOTE — HISTORY OF PRESENT ILLNESS
[FreeTextEntry1] : 8/23/24 - Exam showed trace LE edema.  She is tolerating Jardiance 10 mg.  Will check A1C today.  If still elevated, will increase dose of Jardiance 10 mg.  5/9/24- Please refer to NP note below.  Pt came in for follow up. Pt reports bilateral inner knee pain for 2 months, usually when she sits, describes as pressure. Patient denies CP, SOB, palpitations, or lightheadedness. Patient denies h/o syncope.  Pt is on Losartan 100 mg, Metoprolol 75 mg QD, and HCTZ 25 mg for HTN. She is on Simvastatin 5 mg for HLD. She is on ASA 81 mg for cardioprotection. She is on Metformin 1000 mg for DM.  Today's /80 P 59, repeated /76 P 59.  Patient has trace edema.  A1C 7.2.  I advised patient to start Jardiance 10 mg QD.   1/24/24 - Patient reports home BP of 140. Patient has 1+ edema. I advised patient to check BT.  3-month average sugar level A1C is worse (7.2<7.0). I advised patient to increase Metformin ER to 1000 mg QD.  Normal LDL cholesterol 98.  Abnormal liver function likely due to fatty liver. Please limit carbohydrate intake and exercise more.  10/3/23 - Patient has been stable.  Patient denies CP, SOB, palpitations, or lightheadedness.  Patient reports home SBP around 145.  BP elevated 157/73.  Patient has trace LE edema.  I advised patient to increase Losartan to 100 mg.  Will check BT.  FU 6 months.  Elevated 3-month average sugar level (7.0<6.5).  I advised patient to start Metformin.  4/26/23 - Patient needs cardiac clearance prior to cataract surgery.  Patient has been stable.  Patient denies CP, SOB, or palpitations.  Her BP was elevated because she did not make the changes in medications as discussed with her at the last visit.  She was still often missing the dose of Losartan 50 mg at noon as well as the Metoprolol ER 25 mg at noon.  Again I advised her to take Metoprolol ER 75 mg all at once in AM instead of 25 mg TID.  I advised patient to take Losartan 50 mg either in AM so she won't miss the dose.  Patient is otherwise cleared for surgery and anesthesia.  She may hold ASA 81 mg for 7 days prior to surgery if needed.  1/4/23 - Patient needs cardiac clearance prior to cataract surgery.  Patient has been stable.  Patient denies CP, SOB, palpitations, or lightheadedness.  Her BP was elevated because she often misses noon doses of Metoprolol ER and Losartan 50 mg.  I advised patient to take Metoprolol ER 75 mg all at once instead of 25 mg TID.  I advised patient to take Losartan 50 mg either in AM or in PM so she won't miss the dose.  Patient is cleared for surgery and anesthesia.  She may hold ASA 81 mg for 7 days prior to surgery if needed.  10/5/22 - Patient has been stable.  Patient denies CP, SOB, palpitations, or lightheadedness.  Her BP was elevated.  Patient reports home SBP around 140-150.  I advised patient to increase Metoprolol ER to 75 mg QD.  I advised patient to check bone density.  She got flu shot already.  I will check BT.  A1C 6.5.  .  4/4/22 - Patient has L hip pain 9/10 that is not tender but she cannot change position and hard to walk since yesterday.  I advised patient to try Celebrex.   9/30/21 - Patient reports that last Monday she experienced left-sided back pain from midnight to 1 AM.  Patient denies exertional back pain.  Patient denies CP.  Patient reports MOSHER.  Patient denies palpitations.  Patient denies lightheadedness.  Patient denies h/o syncope.  She is not taking Metformin.  2/6 MAZIN noted diffusely.  ECG showed NSR and APC's.  Will check BT.  Get flu shot.  Patient declined echo STR.  She may need holter in the future.  1/31/21 - Patient has been stable.  Patient is not taking Metformin yet because she wants to try diet.  Patient denies CP, SOB, palpitations, or lightheadedness.  She is taking Metoprolol 25 mg BID.  I will check BT today.  Her BP was good.  Patient requests Metoprolol 25 mg BID instead of Metoprolol ER 50 mg.  8/24/20 - Patient is doing well. She declined mammogram and bone density test. She is on diuretics, Lipitor 5 mg, and Losartan. Patient reports  at home. I advised patient to increase dose of Metoprolol to 50 mg. I advised patient to get flu shot. Fu in 6 months.

## 2024-08-23 NOTE — PHYSICAL EXAM
[General Appearance - Well Developed] : well developed [Normal Appearance] : normal appearance [Well Groomed] : well groomed [General Appearance - Well Nourished] : well nourished [No Deformities] : no deformities [General Appearance - In No Acute Distress] : no acute distress [Normal Conjunctiva] : the conjunctiva exhibited no abnormalities [Eyelids - No Xanthelasma] : the eyelids demonstrated no xanthelasmas [Normal Oral Mucosa] : normal oral mucosa [No Oral Pallor] : no oral pallor [No Oral Cyanosis] : no oral cyanosis [Normal Jugular Venous A Waves Present] : normal jugular venous A waves present [Normal Jugular Venous V Waves Present] : normal jugular venous V waves present [No Jugular Venous Martinez A Waves] : no jugular venous martinez A waves [Respiration, Rhythm And Depth] : normal respiratory rhythm and effort [Exaggerated Use Of Accessory Muscles For Inspiration] : no accessory muscle use [Auscultation Breath Sounds / Voice Sounds] : lungs were clear to auscultation bilaterally [Scattered Wheezes] : scattered wheezing [Heart Rate And Rhythm] : heart rate and rhythm were normal [Heart Sounds] : normal S1 and S2 [Murmurs] : no murmurs present [Arterial Pulses Normal] : the arterial pulses were normal [Abdomen Soft] : soft [Abdomen Tenderness] : non-tender [Abdomen Mass (___ Cm)] : no abdominal mass palpated [Gait - Sufficient For Exercise Testing] : the gait was sufficient for exercise testing [Abnormal Walk] : normal gait [Nail Clubbing] : no clubbing of the fingernails [Cyanosis, Localized] : no localized cyanosis [] : no ischemic changes [Petechial Hemorrhages (___cm)] : no petechial hemorrhages [Oriented To Time, Place, And Person] : oriented to person, place, and time [Affect] : the affect was normal [Mood] : the mood was normal [No Anxiety] : not feeling anxious [FreeTextEntry1] : limited mobility.

## 2024-08-26 LAB
ALBUMIN SERPL ELPH-MCNC: 4.4 G/DL
ALP BLD-CCNC: 72 U/L
ALT SERPL-CCNC: 82 U/L
ANION GAP SERPL CALC-SCNC: 12 MMOL/L
AST SERPL-CCNC: 79 U/L
BILIRUB SERPL-MCNC: 0.6 MG/DL
BUN SERPL-MCNC: 22 MG/DL
CALCIUM SERPL-MCNC: 9.5 MG/DL
CHLORIDE SERPL-SCNC: 100 MMOL/L
CO2 SERPL-SCNC: 28 MMOL/L
CREAT SERPL-MCNC: 0.66 MG/DL
EGFR: 85 ML/MIN/1.73M2
ESTIMATED AVERAGE GLUCOSE: 154 MG/DL
GLUCOSE SERPL-MCNC: 121 MG/DL
HBA1C MFR BLD HPLC: 7 %
POTASSIUM SERPL-SCNC: 4.4 MMOL/L
PROT SERPL-MCNC: 7.5 G/DL
SODIUM SERPL-SCNC: 140 MMOL/L

## 2024-09-04 ENCOUNTER — RX RENEWAL (OUTPATIENT)
Age: 89
End: 2024-09-04

## 2024-09-06 ENCOUNTER — RX RENEWAL (OUTPATIENT)
Age: 89
End: 2024-09-06

## 2024-09-26 ENCOUNTER — RX RENEWAL (OUTPATIENT)
Age: 89
End: 2024-09-26

## 2024-10-25 ENCOUNTER — APPOINTMENT (OUTPATIENT)
Dept: CARDIOLOGY | Facility: CLINIC | Age: 89
End: 2024-10-25
Payer: MEDICARE

## 2024-10-25 VITALS
WEIGHT: 178 LBS | DIASTOLIC BLOOD PRESSURE: 69 MMHG | SYSTOLIC BLOOD PRESSURE: 134 MMHG | HEART RATE: 72 BPM | BODY MASS INDEX: 33.63 KG/M2 | OXYGEN SATURATION: 93 %

## 2024-10-25 DIAGNOSIS — E78.5 HYPERLIPIDEMIA, UNSPECIFIED: ICD-10-CM

## 2024-10-25 DIAGNOSIS — I10 ESSENTIAL (PRIMARY) HYPERTENSION: ICD-10-CM

## 2024-10-25 DIAGNOSIS — E11.9 TYPE 2 DIABETES MELLITUS W/OUT COMPLICATIONS: ICD-10-CM

## 2024-10-25 DIAGNOSIS — R60.9 EDEMA, UNSPECIFIED: ICD-10-CM

## 2024-10-25 PROCEDURE — 99214 OFFICE O/P EST MOD 30 MIN: CPT

## 2024-10-25 RX ORDER — ORAL SEMAGLUTIDE 3 MG/1
3 TABLET ORAL
Qty: 30 | Refills: 0 | Status: ACTIVE | COMMUNITY
Start: 2024-10-25 | End: 1900-01-01

## 2024-11-25 ENCOUNTER — NON-APPOINTMENT (OUTPATIENT)
Age: 89
End: 2024-11-25

## 2024-11-25 ENCOUNTER — APPOINTMENT (OUTPATIENT)
Dept: CARDIOLOGY | Facility: CLINIC | Age: 89
End: 2024-11-25
Payer: MEDICARE

## 2024-11-25 VITALS
SYSTOLIC BLOOD PRESSURE: 161 MMHG | RESPIRATION RATE: 18 BRPM | HEART RATE: 62 BPM | DIASTOLIC BLOOD PRESSURE: 72 MMHG | WEIGHT: 186 LBS | OXYGEN SATURATION: 94 % | BODY MASS INDEX: 35.14 KG/M2

## 2024-11-25 DIAGNOSIS — E11.9 TYPE 2 DIABETES MELLITUS W/OUT COMPLICATIONS: ICD-10-CM

## 2024-11-25 DIAGNOSIS — I10 ESSENTIAL (PRIMARY) HYPERTENSION: ICD-10-CM

## 2024-11-25 PROCEDURE — 99213 OFFICE O/P EST LOW 20 MIN: CPT

## 2024-11-25 RX ORDER — ORAL SEMAGLUTIDE 3 MG/1
3 TABLET ORAL
Qty: 30 | Refills: 0 | Status: ACTIVE | COMMUNITY
Start: 2024-10-25 | End: 1900-01-01

## 2024-11-26 ENCOUNTER — RX RENEWAL (OUTPATIENT)
Age: 89
End: 2024-11-26

## 2025-04-05 ENCOUNTER — RX RENEWAL (OUTPATIENT)
Age: 89
End: 2025-04-05

## 2025-04-26 ENCOUNTER — RX RENEWAL (OUTPATIENT)
Age: 89
End: 2025-04-26

## 2025-08-28 ENCOUNTER — RX RENEWAL (OUTPATIENT)
Age: 89
End: 2025-08-28

## 2025-08-29 ENCOUNTER — RX RENEWAL (OUTPATIENT)
Age: 89
End: 2025-08-29

## 2025-09-17 ENCOUNTER — RX RENEWAL (OUTPATIENT)
Age: 89
End: 2025-09-17